# Patient Record
Sex: FEMALE | Race: WHITE | HISPANIC OR LATINO | Employment: FULL TIME | URBAN - METROPOLITAN AREA
[De-identification: names, ages, dates, MRNs, and addresses within clinical notes are randomized per-mention and may not be internally consistent; named-entity substitution may affect disease eponyms.]

---

## 2017-02-11 ENCOUNTER — HOSPITAL ENCOUNTER (OUTPATIENT)
Dept: LAB | Facility: MEDICAL CENTER | Age: 52
End: 2017-02-11
Attending: FAMILY MEDICINE
Payer: COMMERCIAL

## 2017-02-11 LAB
25(OH)D3 SERPL-MCNC: 50 NG/ML (ref 30–100)
ALBUMIN SERPL BCP-MCNC: 4 G/DL (ref 3.2–4.9)
ALBUMIN/GLOB SERPL: 1.3 G/DL
ALP SERPL-CCNC: 65 U/L (ref 30–99)
ALT SERPL-CCNC: 14 U/L (ref 2–50)
ANION GAP SERPL CALC-SCNC: 10 MMOL/L (ref 0–11.9)
AST SERPL-CCNC: 13 U/L (ref 12–45)
BASOPHILS # BLD AUTO: 0.04 K/UL (ref 0–0.12)
BASOPHILS NFR BLD AUTO: 0.6 % (ref 0–1.8)
BILIRUB SERPL-MCNC: 0.3 MG/DL (ref 0.1–1.5)
BUN SERPL-MCNC: 9 MG/DL (ref 8–22)
CALCIUM SERPL-MCNC: 9.3 MG/DL (ref 8.5–10.5)
CHLORIDE SERPL-SCNC: 105 MMOL/L (ref 96–112)
CHOLEST SERPL-MCNC: 165 MG/DL (ref 100–199)
CO2 SERPL-SCNC: 24 MMOL/L (ref 20–33)
CREAT SERPL-MCNC: 0.75 MG/DL (ref 0.5–1.4)
EOSINOPHIL # BLD: 0.11 K/UL (ref 0–0.51)
EOSINOPHIL NFR BLD AUTO: 1.7 % (ref 0–6.9)
ERYTHROCYTE [DISTWIDTH] IN BLOOD BY AUTOMATED COUNT: 44.4 FL (ref 35.9–50)
EST. AVERAGE GLUCOSE BLD GHB EST-MCNC: 134 MG/DL
ESTRADIOL SERPL-MCNC: <20 PG/ML
FSH SERPL-ACNC: 15.4 MIU/ML
GLOBULIN SER CALC-MCNC: 3.1 G/DL (ref 1.9–3.5)
GLUCOSE SERPL-MCNC: 131 MG/DL (ref 65–99)
HBA1C MFR BLD: 6.3 % (ref 0–5.6)
HCT VFR BLD AUTO: 39.6 % (ref 37–47)
HDLC SERPL-MCNC: 46 MG/DL
HGB BLD-MCNC: 12.6 G/DL (ref 12–16)
IMM GRANULOCYTES # BLD AUTO: 0.01 K/UL (ref 0–0.11)
IMM GRANULOCYTES NFR BLD AUTO: 0.2 % (ref 0–0.9)
LDLC SERPL CALC-MCNC: 97 MG/DL
LH SERPL-ACNC: 7 IU/L
LYMPHOCYTES # BLD: 1.64 K/UL (ref 1–4.8)
LYMPHOCYTES NFR BLD AUTO: 25.7 % (ref 22–41)
MCH RBC QN AUTO: 28.3 PG (ref 27–33)
MCHC RBC AUTO-ENTMCNC: 31.8 G/DL (ref 33.6–35)
MCV RBC AUTO: 88.8 FL (ref 81.4–97.8)
MONOCYTES # BLD: 0.34 K/UL (ref 0–0.85)
MONOCYTES NFR BLD AUTO: 5.3 % (ref 0–13.4)
NEUTROPHILS # BLD: 4.23 K/UL (ref 2–7.15)
NEUTROPHILS NFR BLD AUTO: 66.5 % (ref 44–72)
NRBC # BLD AUTO: 0 K/UL
NRBC BLD-RTO: 0 /100 WBC
PLATELET # BLD AUTO: 413 K/UL (ref 164–446)
PMV BLD AUTO: 9.6 FL (ref 9–12.9)
POTASSIUM SERPL-SCNC: 4.1 MMOL/L (ref 3.6–5.5)
PROT SERPL-MCNC: 7.1 G/DL (ref 6–8.2)
RBC # BLD AUTO: 4.46 M/UL (ref 4.2–5.4)
SODIUM SERPL-SCNC: 139 MMOL/L (ref 135–145)
T4 FREE SERPL-MCNC: 0.76 NG/DL (ref 0.53–1.43)
TRIGL SERPL-MCNC: 110 MG/DL (ref 0–149)
TSH SERPL DL<=0.005 MIU/L-ACNC: 0.73 UIU/ML (ref 0.3–3.7)
WBC # BLD AUTO: 6.4 K/UL (ref 4.8–10.8)

## 2017-02-11 PROCEDURE — 83001 ASSAY OF GONADOTROPIN (FSH): CPT

## 2017-02-11 PROCEDURE — 82670 ASSAY OF TOTAL ESTRADIOL: CPT

## 2017-02-11 PROCEDURE — 82306 VITAMIN D 25 HYDROXY: CPT

## 2017-02-11 PROCEDURE — 80061 LIPID PANEL: CPT

## 2017-02-11 PROCEDURE — 83002 ASSAY OF GONADOTROPIN (LH): CPT

## 2017-02-11 PROCEDURE — 85025 COMPLETE CBC W/AUTO DIFF WBC: CPT

## 2017-02-11 PROCEDURE — 80053 COMPREHEN METABOLIC PANEL: CPT

## 2017-02-11 PROCEDURE — 84439 ASSAY OF FREE THYROXINE: CPT

## 2017-02-11 PROCEDURE — 36415 COLL VENOUS BLD VENIPUNCTURE: CPT

## 2017-02-11 PROCEDURE — 84443 ASSAY THYROID STIM HORMONE: CPT

## 2017-02-11 PROCEDURE — 83036 HEMOGLOBIN GLYCOSYLATED A1C: CPT

## 2017-02-17 ENCOUNTER — HOSPITAL ENCOUNTER (OUTPATIENT)
Dept: RADIOLOGY | Facility: MEDICAL CENTER | Age: 52
End: 2017-02-17
Attending: FAMILY MEDICINE
Payer: COMMERCIAL

## 2017-02-17 DIAGNOSIS — Z13.9 SCREENING: ICD-10-CM

## 2017-02-17 PROCEDURE — 77063 BREAST TOMOSYNTHESIS BI: CPT

## 2017-05-05 ENCOUNTER — HOSPITAL ENCOUNTER (OUTPATIENT)
Dept: LAB | Facility: MEDICAL CENTER | Age: 52
End: 2017-05-05
Attending: FAMILY MEDICINE
Payer: COMMERCIAL

## 2017-05-05 LAB
ESTRADIOL SERPL-MCNC: 239 PG/ML
FSH SERPL-ACNC: 27.7 MIU/ML
LH SERPL-ACNC: 38 IU/L

## 2017-05-05 PROCEDURE — 82670 ASSAY OF TOTAL ESTRADIOL: CPT

## 2017-05-05 PROCEDURE — 36415 COLL VENOUS BLD VENIPUNCTURE: CPT

## 2017-05-05 PROCEDURE — 83001 ASSAY OF GONADOTROPIN (FSH): CPT

## 2017-05-05 PROCEDURE — 83002 ASSAY OF GONADOTROPIN (LH): CPT

## 2017-08-25 ENCOUNTER — HOSPITAL ENCOUNTER (OUTPATIENT)
Dept: LAB | Facility: MEDICAL CENTER | Age: 52
End: 2017-08-25
Attending: FAMILY MEDICINE
Payer: COMMERCIAL

## 2017-08-25 LAB
FASTING STATUS PATIENT QL REPORTED: NORMAL
TSH SERPL DL<=0.005 MIU/L-ACNC: 0.68 UIU/ML (ref 0.3–3.7)

## 2017-08-25 PROCEDURE — 36415 COLL VENOUS BLD VENIPUNCTURE: CPT

## 2017-08-25 PROCEDURE — 84443 ASSAY THYROID STIM HORMONE: CPT

## 2017-10-24 ENCOUNTER — HOSPITAL ENCOUNTER (OUTPATIENT)
Dept: LAB | Facility: MEDICAL CENTER | Age: 52
End: 2017-10-24
Attending: FAMILY MEDICINE
Payer: COMMERCIAL

## 2017-10-24 LAB
T3 SERPL-MCNC: 132.3 NG/DL (ref 60–181)
T4 FREE SERPL-MCNC: 0.67 NG/DL (ref 0.53–1.43)
TSH SERPL DL<=0.005 MIU/L-ACNC: 2.49 UIU/ML (ref 0.3–3.7)

## 2017-10-24 PROCEDURE — 36415 COLL VENOUS BLD VENIPUNCTURE: CPT

## 2017-10-24 PROCEDURE — 84480 ASSAY TRIIODOTHYRONINE (T3): CPT

## 2017-10-24 PROCEDURE — 84439 ASSAY OF FREE THYROXINE: CPT

## 2017-10-24 PROCEDURE — 84443 ASSAY THYROID STIM HORMONE: CPT

## 2017-11-10 ENCOUNTER — OFFICE VISIT (OUTPATIENT)
Dept: URGENT CARE | Facility: PHYSICIAN GROUP | Age: 52
End: 2017-11-10
Payer: COMMERCIAL

## 2017-11-10 VITALS
TEMPERATURE: 97.9 F | HEIGHT: 62 IN | BODY MASS INDEX: 30.91 KG/M2 | SYSTOLIC BLOOD PRESSURE: 122 MMHG | HEART RATE: 92 BPM | OXYGEN SATURATION: 98 % | DIASTOLIC BLOOD PRESSURE: 82 MMHG | RESPIRATION RATE: 14 BRPM | WEIGHT: 168 LBS

## 2017-11-10 DIAGNOSIS — J04.0 LARYNGITIS: ICD-10-CM

## 2017-11-10 DIAGNOSIS — J00 NASOPHARYNGITIS: ICD-10-CM

## 2017-11-10 LAB
INT CON NEG: NEGATIVE
INT CON POS: POSITIVE
S PYO AG THROAT QL: NEGATIVE

## 2017-11-10 PROCEDURE — 99214 OFFICE O/P EST MOD 30 MIN: CPT | Performed by: PHYSICIAN ASSISTANT

## 2017-11-10 PROCEDURE — 87880 STREP A ASSAY W/OPTIC: CPT | Performed by: PHYSICIAN ASSISTANT

## 2017-11-10 ASSESSMENT — ENCOUNTER SYMPTOMS
FEVER: 0
CHILLS: 0
EYE REDNESS: 0
ABDOMINAL PAIN: 0
DIZZINESS: 0
TINGLING: 0
SWOLLEN GLANDS: 0
DIARRHEA: 0
VOMITING: 0
COUGH: 1
MYALGIAS: 0
NECK PAIN: 0
TROUBLE SWALLOWING: 0
EYE DISCHARGE: 0
HOARSE VOICE: 1
WHEEZING: 0
HEADACHES: 0
SORE THROAT: 1
SPUTUM PRODUCTION: 0
SHORTNESS OF BREATH: 0

## 2017-11-10 NOTE — PROGRESS NOTES
"Subjective:      Keshia aEton is a 51 y.o. female who presents with Pharyngitis (on and off poss sinus infection )            Pharyngitis    This is a new problem. Episode onset: week ago. The problem has been waxing and waning. There has been no fever. The pain is at a severity of 3/10. The pain is mild. Associated symptoms include congestion, coughing and a hoarse voice. Pertinent negatives include no abdominal pain, diarrhea, ear discharge, ear pain, headaches, neck pain, shortness of breath, swollen glands, trouble swallowing or vomiting. Associated symptoms comments: Dry cough mostly  . She has had no exposure to strep or mono. Treatments tried: Dayquil and Nyquil formulations.       Review of Systems   Constitutional: Positive for malaise/fatigue. Negative for chills and fever.   HENT: Positive for congestion, hoarse voice and sore throat. Negative for ear discharge, ear pain and trouble swallowing.    Eyes: Negative for discharge and redness.   Respiratory: Positive for cough. Negative for sputum production, shortness of breath and wheezing.    Cardiovascular: Negative for chest pain and leg swelling.   Gastrointestinal: Negative for abdominal pain, diarrhea and vomiting.   Genitourinary: Negative for dysuria and urgency.   Musculoskeletal: Negative for myalgias and neck pain.   Skin: Negative for itching and rash.   Neurological: Negative for dizziness, tingling and headaches.          Objective:     /82   Pulse 92   Temp 36.6 °C (97.9 °F)   Resp 14   Ht 1.575 m (5' 2\")   Wt 76.2 kg (168 lb)   SpO2 98%   BMI 30.73 kg/m²    PMH:  has a past medical history of ASTHMA; Breath shortness; Depression; GERD (gastroesophageal reflux disease); Heart burn; Indigestion; Kidney stone; Obstructive sleep apnea; Renal disorder; and Unspecified disorder of thyroid. She also has no past medical history of Diabetes.  MEDS:   Current Outpatient Prescriptions:   •  buPROPion (WELLBUTRIN XL) 300 MG XL tablet, " Take 300 mg by mouth every morning., Disp: , Rfl:   •  omeprazole (PRILOSEC) 40 MG delayed-release capsule, Take 40 mg by mouth every day., Disp: , Rfl:   •  duloxetine (CYMBALTA) 60 MG Cap DR Particles delayed-release capsule, Take 60 mg by mouth every day., Disp: , Rfl:   •  beclomethasone (QVAR) 40 MCG/ACT inhaler, Inhale 1 Puff by mouth every day., Disp: , Rfl:   •  albuterol (VENTOLIN OR PROVENTIL) 108 (90 BASE) MCG/ACT AERS, Inhale 2 Puffs by mouth every 6 hours as needed for Shortness of Breath., Disp: , Rfl:   •  Cholecalciferol (VITAMIN D PO), Take 1 Cap by mouth every day., Disp: , Rfl:   •  ibuprofen (MOTRIN) 200 MG Tab, Take 600 mg by mouth every 6 hours as needed for Mild Pain., Disp: , Rfl:   •  DiphenhydrAMINE HCl (ALLERGY MEDICINE PO), Take 2 Tabs by mouth as needed (For allergies)., Disp: , Rfl:   •  metoclopramide (REGLAN) 10 MG Tab, Take 1 Tab by mouth 4 times a day as needed (prn nausea)., Disp: 20 Tab, Rfl: 0  •  dicyclomine (BENTYL) 20 MG Tab, Take 1 Tab by mouth 4 times a day as needed (abdominal pain)., Disp: 20 Tab, Rfl: 0  •  loperamide (IMODIUM) 2 MG Cap, Take 1 Cap by mouth 4 times a day as needed for Diarrhea., Disp: 30 Cap, Rfl: 3  •  trazodone (DESYREL) 100 MG Tab, Take 100 mg by mouth at bedtime as needed for Sleep., Disp: , Rfl:   •  montelukast (SINGULAIR) 10 MG TABS, Take 10 mg by mouth every day., Disp: , Rfl:   •  drospirenone-ethinyl estradiol (BROWN) 3-0.03 MG per tablet, Take 1 Tab by mouth every evening., Disp: , Rfl:   •  levothyroxine (SYNTHROID) 88 MCG TABS, Take 88 mcg by mouth every day., Disp: , Rfl:   ALLERGIES:   Allergies   Allergen Reactions   • Nkda [No Known Drug Allergy]      SURGHX:   Past Surgical History:   Procedure Laterality Date   • CYSTOSCOPY  7/12/2012    Performed by REUBEN SHANNON at SURGERY [facility] ORS   • URETEROSCOPY  7/12/2012    Performed by REUBEN SHANNON at SURGERY [facility] ORS   • LASERTRIPSY  7/12/2012    Performed by MIRTHA,  REUBEN HOOKER at SURGERY [facility] ORS   • MAMMOPLASTY AUGMENTATION  dec 2008   • OTHER ABDOMINAL SURGERY  dec 2008    scar revision - tummy tuck   • PB ENLARGE BREAST WITH IMPLANT       SOCHX:  reports that she quit smoking about 7 years ago. She has never used smokeless tobacco. She reports that she drinks alcohol. She reports that she does not use drugs.  FH: Family history was reviewed, no pertinent findings to report    Physical Exam   Constitutional: She is oriented to person, place, and time. She appears well-developed and well-nourished.   HENT:   Head: Normocephalic and atraumatic.   Mouth/Throat: No oropharyngeal exudate.   Bilateral clear effusions- without bulge or erythema to the TM.   Posterior oropharynx with pos. PND without tonsillar edema or erythema.   Nose- boggy turbinates with mild-moderate amount of nasal discharge.   Eyes: EOM are normal. Pupils are equal, round, and reactive to light.   Neck: Normal range of motion. Neck supple.   Cardiovascular: Normal rate and regular rhythm.    Pulmonary/Chest: Effort normal and breath sounds normal. No respiratory distress. She has no wheezes.   Musculoskeletal: Normal range of motion. She exhibits no edema.   Lymphadenopathy:     She has no cervical adenopathy.   Neurological: She is alert and oriented to person, place, and time.   Skin: Skin is warm. No rash noted.   Psychiatric: She has a normal mood and affect. Her behavior is normal.   Vitals reviewed.           POC strep- negative.      Assessment/Plan:     1. Laryngitis  - POCT Rapid Strep A    2. Nasopharyngitis    It was explained to the pt. Today that due to the viral nature of the pt's illness, we will treat symptomatically today. Encouraged OTC supportive meds PRN. Humidification, increase fluids, avoid night time dairy, tea and salt water gargles.   Patient given precautionary s/sx that mandate immediate follow up and evaluation in the ED. Advised of risks of not doing so.    DDX, Supportive  care, and indications for immediate follow-up discussed with patient.    Instructed to return to clinic or nearest emergency department if we are not available for any change in condition, further concerns, or worsening of symptoms.    The patient demonstrated a good understanding and agreed with the treatment plan.

## 2017-11-14 ENCOUNTER — TELEPHONE (OUTPATIENT)
Dept: CARDIOLOGY | Facility: MEDICAL CENTER | Age: 52
End: 2017-11-14

## 2017-11-14 NOTE — TELEPHONE ENCOUNTER
Spoke with patient; gave her instructions to our office; never seen a cardiologist; no recent cardiac testing other than EKG in chart under media; advised to bring ID and INS cards and a medication list.  Pt thankful for the phone call.

## 2017-11-17 ENCOUNTER — OFFICE VISIT (OUTPATIENT)
Dept: CARDIOLOGY | Facility: MEDICAL CENTER | Age: 52
End: 2017-11-17
Payer: COMMERCIAL

## 2017-11-17 VITALS
HEIGHT: 62 IN | HEART RATE: 102 BPM | DIASTOLIC BLOOD PRESSURE: 86 MMHG | OXYGEN SATURATION: 96 % | BODY MASS INDEX: 30.18 KG/M2 | SYSTOLIC BLOOD PRESSURE: 144 MMHG | WEIGHT: 164 LBS

## 2017-11-17 DIAGNOSIS — F15.90 OTHER STIMULANT USE, UNSPECIFIED, UNCOMPLICATED: ICD-10-CM

## 2017-11-17 DIAGNOSIS — G47.33 OBSTRUCTIVE SLEEP APNEA: ICD-10-CM

## 2017-11-17 DIAGNOSIS — F41.9 ANXIETY: ICD-10-CM

## 2017-11-17 DIAGNOSIS — J45.20 MILD INTERMITTENT ASTHMA WITHOUT COMPLICATION: ICD-10-CM

## 2017-11-17 DIAGNOSIS — R00.0 TACHYCARDIA: ICD-10-CM

## 2017-11-17 PROCEDURE — 99243 OFF/OP CNSLTJ NEW/EST LOW 30: CPT | Performed by: INTERNAL MEDICINE

## 2017-11-17 RX ORDER — BUPROPION HYDROCHLORIDE 150 MG/1
150 TABLET, EXTENDED RELEASE ORAL 2 TIMES DAILY
COMMUNITY
End: 2019-10-26

## 2017-11-17 RX ORDER — THYROID, PORCINE 60 MG/1
65 TABLET ORAL DAILY
COMMUNITY
End: 2019-10-26

## 2017-11-17 RX ORDER — DULOXETIN HYDROCHLORIDE 30 MG/1
30 CAPSULE, DELAYED RELEASE ORAL DAILY
COMMUNITY

## 2017-11-17 NOTE — PROGRESS NOTES
Subjective:   Keshia Eaton is a 51 y.o. female who presents today For initial consultation regarding intermittent sinus tachycardia. She has a history of untreated mild obstructive sleep apnea, depression on high-dose Wellbutrin and Cymbalta as well as obesity currently undergoing weight loss therapy with the addition of phentermine. She also has a history of anxiety. She presents in referral after several primary care visits have revealed sinus tachycardia. She had an EKG at her last appointment which is sinus tachycardia at 102 bpm. There is quite a bit of artifact during the tracing in the computer readout is an error. It is an otherwise normal ECG. She has no symptoms or exertional symptoms and spends 3 times per week and her efforts towards weight loss. Also she has noted to undergo menopause with hot flashes and mood swings.    Denies any other cardiovascular symptoms including chest pain, shortness of breath, dyspnea on exertion, lightheadedness, syncope or presyncope, lower extremity edema, PND, orthopnea or palpitations.      Past Medical History:   Diagnosis Date   • ASTHMA    • Breath shortness    • Depression    • GERD (gastroesophageal reflux disease)    • Heart burn    • Indigestion    • Kidney stone    • Obstructive sleep apnea    • Renal disorder     KIDNEY STONE   • Unspecified disorder of thyroid      Past Surgical History:   Procedure Laterality Date   • CYSTOSCOPY  7/12/2012    Performed by REUBEN SHANNON at SURGERY [facility] ORS   • URETEROSCOPY  7/12/2012    Performed by REUBEN SHANNON at SURGERY [facility]   • LASERTRIPSY  7/12/2012    Performed by REUBEN SHANNON at [facility]   • MAMMOPLASTY AUGMENTATION  dec 2008   • OTHER ABDOMINAL SURGERY  dec 2008    scar revision - tummy tuck   • PB ENLARGE BREAST WITH IMPLANT       Family History   Problem Relation Age of Onset   • Diabetes Mother    • Stroke Mother    • Hypertension Mother    • Stroke Father       History   Smoking Status   • Former Smoker   • Quit date: 1/1/2010   Smokeless Tobacco   • Never Used     Comment: 1 month ago     Allergies   Allergen Reactions   • Nkda [No Known Drug Allergy]      Outpatient Encounter Prescriptions as of 11/17/2017   Medication Sig Dispense Refill   • buPROPion SR (WELLBUTRIN-SR) 150 MG TABLET SR 12 HR sustained-release tablet Take 150 mg by mouth 2 times a day.     • duloxetine (CYMBALTA) 30 MG Cap DR Particles Take 30 mg by mouth every day.     • Mometasone Furoate (ASMANEX HFA INH) Inhale  by mouth.     • thyroid (NATURE-THROID) 65 MG tablet Take 65 mg by mouth every day.     • buPROPion (WELLBUTRIN XL) 300 MG XL tablet Take 300 mg by mouth every morning.     • omeprazole (PRILOSEC) 40 MG delayed-release capsule Take 40 mg by mouth every day.     • Cholecalciferol (VITAMIN D PO) Take 1 Cap by mouth every day.     • ibuprofen (MOTRIN) 200 MG Tab Take 600 mg by mouth every 6 hours as needed for Mild Pain.     • DiphenhydrAMINE HCl (ALLERGY MEDICINE PO) Take 2 Tabs by mouth as needed (For allergies).     • albuterol (VENTOLIN OR PROVENTIL) 108 (90 BASE) MCG/ACT AERS Inhale 2 Puffs by mouth every 6 hours as needed for Shortness of Breath.     • [DISCONTINUED] duloxetine (CYMBALTA) 60 MG Cap DR Particles delayed-release capsule Take 60 mg by mouth every day.     • [DISCONTINUED] metoclopramide (REGLAN) 10 MG Tab Take 1 Tab by mouth 4 times a day as needed (prn nausea). 20 Tab 0   • [DISCONTINUED] dicyclomine (BENTYL) 20 MG Tab Take 1 Tab by mouth 4 times a day as needed (abdominal pain). 20 Tab 0   • [DISCONTINUED] loperamide (IMODIUM) 2 MG Cap Take 1 Cap by mouth 4 times a day as needed for Diarrhea. 30 Cap 3   • [DISCONTINUED] trazodone (DESYREL) 100 MG Tab Take 100 mg by mouth at bedtime as needed for Sleep.     • [DISCONTINUED] montelukast (SINGULAIR) 10 MG TABS Take 10 mg by mouth every day.     • [DISCONTINUED] beclomethasone (QVAR) 40 MCG/ACT inhaler Inhale 1 Puff by  "mouth every day.     • [DISCONTINUED] drospirenone-ethinyl estradiol (BROWN) 3-0.03 MG per tablet Take 1 Tab by mouth every evening.     • [DISCONTINUED] levothyroxine (SYNTHROID) 88 MCG TABS Take 88 mcg by mouth every day.       No facility-administered encounter medications on file as of 11/17/2017.      Review of Systems   All other systems reviewed and are negative.       Objective:   /86   Pulse (!) 102   Ht 1.575 m (5' 2\")   Wt 74.4 kg (164 lb)   SpO2 96%   BMI 30.00 kg/m²     Physical Exam   Constitutional: She is oriented to person, place, and time. She appears well-developed and well-nourished. No distress.   HENT:   Head: Normocephalic and atraumatic.   Mouth/Throat: Oropharynx is clear and moist. No oropharyngeal exudate.   Eyes: Conjunctivae are normal. Pupils are equal, round, and reactive to light. No scleral icterus.   Neck: Normal range of motion. Neck supple. No JVD present. No thyromegaly present.   Cardiovascular: Normal rate, regular rhythm, normal heart sounds and intact distal pulses.  Exam reveals no gallop and no friction rub.    No murmur heard.  Pulses:       Carotid pulses are 2+ on the right side, and 2+ on the left side.       Radial pulses are 2+ on the right side, and 2+ on the left side.        Popliteal pulses are 2+ on the right side, and 2+ on the left side.        Dorsalis pedis pulses are 2+ on the right side, and 2+ on the left side.        Posterior tibial pulses are 2+ on the right side, and 2+ on the left side.   Pulmonary/Chest: Effort normal and breath sounds normal. She has no wheezes. She has no rales.   Abdominal: Soft. Bowel sounds are normal. She exhibits no distension. There is no tenderness.   Musculoskeletal: She exhibits no edema or tenderness.   Neurological: She is alert and oriented to person, place, and time. No cranial nerve deficit (Cranial nerves II through XII grossly intact).   Skin: Skin is warm and dry. No rash noted. She is not diaphoretic. " No erythema.   Psychiatric: She has a normal mood and affect. Her behavior is normal.   Vitals reviewed.    EKG (10/26/2017):  I have personally reviewed the EKG this visit and discussed with the patient. It shows sinus tachycardia 102 bpm. OTHERWISE NORMAL ECG    Assessment:     1. Tachycardia  ECHOCARDIOGRAM COMP W/O CONT   2. Obstructive sleep apnea     3. Mild intermittent asthma without complication     4. Other stimulant use, unspecified, uncomplicated     5. Anxiety         Medical Decision Making:  Today's Assessment / Status / Plan:   She has intermittent asymptomatic sinus tachycardia explained by her phentermine, anxiety, deconditioning, perimenopausal state as well as untreated sleep apnea. Of note, she has multiple visits in the [place] system with blood pressure and heart rates that are all normal as recently as last month. We discussed that her resting tachycardia is likely a byproduct of the above but we will check an echocardiogram to ensure there are no structural changes that would contribute. Otherwise she should make efforts towards weight loss reconditioning and trying to treat her sleep apnea. If the echocardiogram is unremarkable and recommend follow-up as needed with cardiology only.    Thank you for this interesting consultation. It was my pleasure to see Keshia Eaton today.    Cayden Spicer MD, FACC, [facility]  Division of Interventional Cardiology  [facility]

## 2017-11-17 NOTE — LETTER
[facility]   [address]  [place] [address]  Phone: [phone]  Fax: [phone]              Keshia Eaton  1965    Encounter Date: 11/17/2017    Dr. Florentino,     Thank you for the referral. I had the pleasure of seeing Keshia Eaton today in cardiology clinic. I've attached my visit note below. If you have any questions please feel free to give me a call anytime.      Cayden Spicer MD, FACC, [facility]  Interventional Cardiology  [facility]                                                                    PROGRESS NOTE:  Subjective:   Keshia Eaton is a 51 y.o. female who presents today For initial consultation regarding intermittent sinus tachycardia. She has a history of untreated mild obstructive sleep apnea, depression on high-dose Wellbutrin and Cymbalta as well as obesity currently undergoing weight loss therapy with the addition of phentermine. She also has a history of anxiety. She presents in referral after several primary care visits have revealed sinus tachycardia. She had an EKG at her last appointment which is sinus tachycardia at 102 bpm. There is quite a bit of artifact during the tracing in the computer readout is an error. It is an otherwise normal ECG. She has no symptoms or exertional symptoms and spends 3 times per week and her efforts towards weight loss. Also she has noted to undergo menopause with hot flashes and mood swings.    Denies any other cardiovascular symptoms including chest pain, shortness of breath, dyspnea on exertion, lightheadedness, syncope or presyncope, lower extremity edema, PND, orthopnea or palpitations.      Past Medical History:   Diagnosis Date   • ASTHMA    • Breath shortness    • Depression    • GERD (gastroesophageal reflux disease)    • Heart burn    • Indigestion    • Kidney stone    • Obstructive sleep apnea    • Renal disorder     KIDNEY  STONE   • Unspecified disorder of thyroid      Past Surgical History:   Procedure Laterality Date   • CYSTOSCOPY  7/12/2012    Performed by REUBEN SHANNON at SURGERY [facility] ORS   • URETEROSCOPY  7/12/2012    Performed by REUBEN SHANNON at SURGERY [facility] ORS   • LASERTRIPSY  7/12/2012    Performed by REUBEN SHANNON at SURGERY [facility] ORS   • MAMMOPLASTY AUGMENTATION  dec 2008   • OTHER ABDOMINAL SURGERY  dec 2008    scar revision - tummy tuck   • PB ENLARGE BREAST WITH IMPLANT       Family History   Problem Relation Age of Onset   • Diabetes Mother    • Stroke Mother    • Hypertension Mother    • Stroke Father      History   Smoking Status   • Former Smoker   • Quit date: 1/1/2010   Smokeless Tobacco   • Never Used     Comment: 1 month ago     Allergies   Allergen Reactions   • Nkda [No Known Drug Allergy]      Outpatient Encounter Prescriptions as of 11/17/2017   Medication Sig Dispense Refill   • buPROPion SR (WELLBUTRIN-SR) 150 MG TABLET SR 12 HR sustained-release tablet Take 150 mg by mouth 2 times a day.     • duloxetine (CYMBALTA) 30 MG Cap DR Particles Take 30 mg by mouth every day.     • Mometasone Furoate (ASMANEX HFA INH) Inhale  by mouth.     • thyroid (NATURE-THROID) 65 MG tablet Take 65 mg by mouth every day.     • buPROPion (WELLBUTRIN XL) 300 MG XL tablet Take 300 mg by mouth every morning.     • omeprazole (PRILOSEC) 40 MG delayed-release capsule Take 40 mg by mouth every day.     • Cholecalciferol (VITAMIN D PO) Take 1 Cap by mouth every day.     • ibuprofen (MOTRIN) 200 MG Tab Take 600 mg by mouth every 6 hours as needed for Mild Pain.     • DiphenhydrAMINE HCl (ALLERGY MEDICINE PO) Take 2 Tabs by mouth as needed (For allergies).     • albuterol (VENTOLIN OR PROVENTIL) 108 (90 BASE) MCG/ACT AERS Inhale 2 Puffs by mouth every 6 hours as needed for Shortness of Breath.     • [DISCONTINUED] duloxetine (CYMBALTA) 60 MG Cap DR Particles delayed-release capsule Take 60 mg by  "mouth every day.     • [DISCONTINUED] metoclopramide (REGLAN) 10 MG Tab Take 1 Tab by mouth 4 times a day as needed (prn nausea). 20 Tab 0   • [DISCONTINUED] dicyclomine (BENTYL) 20 MG Tab Take 1 Tab by mouth 4 times a day as needed (abdominal pain). 20 Tab 0   • [DISCONTINUED] loperamide (IMODIUM) 2 MG Cap Take 1 Cap by mouth 4 times a day as needed for Diarrhea. 30 Cap 3   • [DISCONTINUED] trazodone (DESYREL) 100 MG Tab Take 100 mg by mouth at bedtime as needed for Sleep.     • [DISCONTINUED] montelukast (SINGULAIR) 10 MG TABS Take 10 mg by mouth every day.     • [DISCONTINUED] beclomethasone (QVAR) 40 MCG/ACT inhaler Inhale 1 Puff by mouth every day.     • [DISCONTINUED] drospirenone-ethinyl estradiol (BROWN) 3-0.03 MG per tablet Take 1 Tab by mouth every evening.     • [DISCONTINUED] levothyroxine (SYNTHROID) 88 MCG TABS Take 88 mcg by mouth every day.       No facility-administered encounter medications on file as of 11/17/2017.      Review of Systems   All other systems reviewed and are negative.       Objective:   /86   Pulse (!) 102   Ht 1.575 m (5' 2\")   Wt 74.4 kg (164 lb)   SpO2 96%   BMI 30.00 kg/m²      Physical Exam   Constitutional: She is oriented to person, place, and time. She appears well-developed and well-nourished. No distress.   HENT:   Head: Normocephalic and atraumatic.   Mouth/Throat: Oropharynx is clear and moist. No oropharyngeal exudate.   Eyes: Conjunctivae are normal. Pupils are equal, round, and reactive to light. No scleral icterus.   Neck: Normal range of motion. Neck supple. No JVD present. No thyromegaly present.   Cardiovascular: Normal rate, regular rhythm, normal heart sounds and intact distal pulses.  Exam reveals no gallop and no friction rub.    No murmur heard.  Pulses:       Carotid pulses are 2+ on the right side, and 2+ on the left side.       Radial pulses are 2+ on the right side, and 2+ on the left side.        Popliteal pulses are 2+ on the right side, " and 2+ on the left side.        Dorsalis pedis pulses are 2+ on the right side, and 2+ on the left side.        Posterior tibial pulses are 2+ on the right side, and 2+ on the left side.   Pulmonary/Chest: Effort normal and breath sounds normal. She has no wheezes. She has no rales.   Abdominal: Soft. Bowel sounds are normal. She exhibits no distension. There is no tenderness.   Musculoskeletal: She exhibits no edema or tenderness.   Neurological: She is alert and oriented to person, place, and time. No cranial nerve deficit (Cranial nerves II through XII grossly intact).   Skin: Skin is warm and dry. No rash noted. She is not diaphoretic. No erythema.   Psychiatric: She has a normal mood and affect. Her behavior is normal.   Vitals reviewed.    EKG (10/26/2017):  I have personally reviewed the EKG this visit and discussed with the patient. It shows sinus tachycardia 102 bpm. OTHERWISE NORMAL ECG    Assessment:     1. Tachycardia  ECHOCARDIOGRAM COMP W/O CONT   2. Obstructive sleep apnea     3. Mild intermittent asthma without complication     4. Other stimulant use, unspecified, uncomplicated     5. Anxiety         Medical Decision Making:  Today's Assessment / Status / Plan:   She has intermittent asymptomatic sinus tachycardia explained by her phentermine, anxiety, deconditioning, perimenopausal state as well as untreated sleep apnea. Of note, she has multiple visits in the [place] system with blood pressure and heart rates that are all normal as recently as last month. We discussed that her resting tachycardia is likely a byproduct of the above but we will check an echocardiogram to ensure there are no structural changes that would contribute. Otherwise she should make efforts towards weight loss reconditioning and trying to treat her sleep apnea. If the echocardiogram is unremarkable and recommend follow-up as needed with cardiology only.    Thank you for this interesting consultation. It was my pleasure to see  Keshia Eaton today.    Cayden Spicer MD, FACC, [facility]  Division of Interventional Cardiology  [facility]          Rona Florentino M.D.  [address]  [address] [place] [id number]  VIA Facsimile: [phone]

## 2017-12-08 ENCOUNTER — HOSPITAL ENCOUNTER (OUTPATIENT)
Dept: CARDIOLOGY | Facility: MEDICAL CENTER | Age: 52
End: 2017-12-08
Attending: INTERNAL MEDICINE
Payer: COMMERCIAL

## 2017-12-08 DIAGNOSIS — R00.0 TACHYCARDIA: ICD-10-CM

## 2017-12-08 PROCEDURE — 93306 TTE W/DOPPLER COMPLETE: CPT | Mod: 26 | Performed by: INTERNAL MEDICINE

## 2017-12-08 PROCEDURE — 93306 TTE W/DOPPLER COMPLETE: CPT

## 2017-12-12 LAB
LV EJECT FRACT  99904: 75
LV EJECT FRACT MOD 2C 99903: 85.89
LV EJECT FRACT MOD 4C 99902: 80.42
LV EJECT FRACT MOD BP 99901: 82.98

## 2018-01-05 ENCOUNTER — TELEPHONE (OUTPATIENT)
Dept: CARDIOLOGY | Facility: MEDICAL CENTER | Age: 53
End: 2018-01-05

## 2018-01-05 NOTE — TELEPHONE ENCOUNTER
Pt notified of Echo results. She denies questions at this time and is appreciative of information.    --------------------------  Echocardiography Laboratory    CONCLUSIONS  No prior study is available for comparison.   Low normal left ventricular chamber size.  Normal left ventricular wall thickness.  Normal left ventricular systolic function.  Left ventricular ejection fraction is visually estimated to be greater   than 75%.  Structurally normal aortic and mitral valve without significant   stenosis or regurgitation.  Structurally normal tricuspid valve without significant stenosis or   regurgitation.  Normal pericardium without effusion.  Normal inferior vena cava size and inspiratory collapse.  The right ventricle was normal in size and function.  Dalila Morales M.D.  (Electronically Signed)  ------------------------------------    ----- Message from Barbara Mckenzie sent at 1/5/2018  8:09 AM PST -----  Regarding: echo results  Contact: [phone]  MARVIN/jatin    Pt calling for results of echo done on 12/8, please call Keshia at [phone]

## 2018-03-09 ENCOUNTER — HOSPITAL ENCOUNTER (OUTPATIENT)
Dept: LAB | Facility: MEDICAL CENTER | Age: 53
End: 2018-03-09
Attending: FAMILY MEDICINE
Payer: COMMERCIAL

## 2018-03-09 LAB — TSH SERPL DL<=0.005 MIU/L-ACNC: 1.17 UIU/ML (ref 0.38–5.33)

## 2018-03-09 PROCEDURE — 36415 COLL VENOUS BLD VENIPUNCTURE: CPT

## 2018-03-09 PROCEDURE — 84443 ASSAY THYROID STIM HORMONE: CPT

## 2018-06-01 ENCOUNTER — APPOINTMENT (RX ONLY)
Dept: URBAN - METROPOLITAN AREA CLINIC 20 | Facility: CLINIC | Age: 53
Setting detail: DERMATOLOGY
End: 2018-06-01

## 2018-06-01 DIAGNOSIS — L70.0 ACNE VULGARIS: ICD-10-CM

## 2018-06-01 DIAGNOSIS — L85.3 XEROSIS CUTIS: ICD-10-CM

## 2018-06-01 PROBLEM — K21.9 GASTRO-ESOPHAGEAL REFLUX DISEASE WITHOUT ESOPHAGITIS: Status: ACTIVE | Noted: 2018-06-01

## 2018-06-01 PROBLEM — J45.909 UNSPECIFIED ASTHMA, UNCOMPLICATED: Status: ACTIVE | Noted: 2018-06-01

## 2018-06-01 PROBLEM — F32.9 MAJOR DEPRESSIVE DISORDER, SINGLE EPISODE, UNSPECIFIED: Status: ACTIVE | Noted: 2018-06-01

## 2018-06-01 PROCEDURE — ? COUNSELING

## 2018-06-01 PROCEDURE — 99202 OFFICE O/P NEW SF 15 MIN: CPT

## 2018-06-01 PROCEDURE — ? PRESCRIPTION

## 2018-06-01 RX ORDER — DESONIDE 0.5 MG/G
GEL TOPICAL
Qty: 1 | Refills: 1 | Status: ERX | COMMUNITY
Start: 2018-06-01

## 2018-06-01 RX ORDER — SPIRONOLACTONE 100 MG/1
TABLET, FILM COATED ORAL
Qty: 30 | Refills: 2 | Status: ERX | COMMUNITY
Start: 2018-06-01

## 2018-06-01 RX ADMIN — DESONIDE: 0.5 GEL TOPICAL at 00:00

## 2018-06-01 RX ADMIN — SPIRONOLACTONE: 100 TABLET, FILM COATED ORAL at 00:00

## 2018-06-01 ASSESSMENT — LOCATION SIMPLE DESCRIPTION DERM
LOCATION SIMPLE: RIGHT SMALL FINGER
LOCATION SIMPLE: LEFT RING FINGER
LOCATION SIMPLE: RIGHT THUMB
LOCATION SIMPLE: RIGHT INDEX FINGER
LOCATION SIMPLE: LEFT CHEEK
LOCATION SIMPLE: LEFT THUMB
LOCATION SIMPLE: LEFT INDEX FINGER
LOCATION SIMPLE: LEFT SMALL FINGER
LOCATION SIMPLE: RIGHT RING FINGER
LOCATION SIMPLE: CHEST
LOCATION SIMPLE: LEFT MIDDLE FINGER
LOCATION SIMPLE: LEFT LIP
LOCATION SIMPLE: RIGHT MIDDLE FINGER

## 2018-06-01 ASSESSMENT — LOCATION DETAILED DESCRIPTION DERM
LOCATION DETAILED: LEFT INFERIOR CENTRAL MALAR CHEEK
LOCATION DETAILED: RIGHT INDEX FINGERTIP
LOCATION DETAILED: LEFT SMALL FINGERTIP
LOCATION DETAILED: RIGHT MIDDLE FINGERTIP
LOCATION DETAILED: LEFT MIDDLE FINGERTIP
LOCATION DETAILED: LEFT INFERIOR VERMILION LIP
LOCATION DETAILED: RIGHT SMALL FINGERTIP
LOCATION DETAILED: RIGHT DISTAL RADIAL THUMB
LOCATION DETAILED: LEFT RING FINGERTIP
LOCATION DETAILED: RIGHT RING FINGERTIP
LOCATION DETAILED: LEFT INDEX FINGERTIP
LOCATION DETAILED: LOWER STERNUM
LOCATION DETAILED: LEFT DISTAL RADIAL THUMB

## 2018-06-01 ASSESSMENT — LOCATION ZONE DERM
LOCATION ZONE: TRUNK
LOCATION ZONE: LIP
LOCATION ZONE: FACE
LOCATION ZONE: FINGER

## 2018-06-01 NOTE — PROCEDURE: COUNSELING
Include Pregnancy/Lactation Warning?: No
Detail Level: Zone
Benzoyl Peroxide Pregnancy And Lactation Text: This medication is Pregnancy Category C. It is unknown if benzoyl peroxide is excreted in breast milk.
Dapsone Pregnancy And Lactation Text: This medication is Pregnancy Category C and is not considered safe during pregnancy or breast feeding.
Spironolactone Pregnancy And Lactation Text: This medication can cause feminization of the male fetus and should be avoided during pregnancy. The active metabolite is also found in breast milk.
Azithromycin Counseling:  I discussed with the patient the risks of azithromycin including but not limited to GI upset, allergic reaction, drug rash, diarrhea, and yeast infections.
Minocycline Counseling: Patient advised regarding possible photosensitivity and discoloration of the teeth, skin, lips, tongue and gums.  Patient instructed to avoid sunlight, if possible.  When exposed to sunlight, patients should wear protective clothing, sunglasses, and sunscreen.  The patient was instructed to call the office immediately if the following severe adverse effects occur:  hearing changes, easy bruising/bleeding, severe headache, or vision changes.  The patient verbalized understanding of the proper use and possible adverse effects of minocycline.  All of the patient's questions and concerns were addressed.
Tetracycline Pregnancy And Lactation Text: This medication is Pregnancy Category D and not consider safe during pregnancy. It is also excreted in breast milk.
Topical Retinoid Pregnancy And Lactation Text: This medication is Pregnancy Category C. It is unknown if this medication is excreted in breast milk.
Birth Control Pills Counseling: Birth Control Pill Counseling: I discussed with the patient the potential side effects of OCPs including but not limited to increased risk of stroke, heart attack, thrombophlebitis, deep venous thrombosis, hepatic adenomas, breast changes, GI upset, headaches, and depression.  The patient verbalized understanding of the proper use and possible adverse effects of OCPs. All of the patient's questions and concerns were addressed.
Dapsone Counseling: I discussed with the patient the risks of dapsone including but not limited to hemolytic anemia, agranulocytosis, rashes, methemoglobinemia, kidney failure, peripheral neuropathy, headaches, GI upset, and liver toxicity.  Patients who start dapsone require monitoring including baseline LFTs and weekly CBCs for the first month, then every month thereafter.  The patient verbalized understanding of the proper use and possible adverse effects of dapsone.  All of the patient's questions and concerns were addressed.
Spironolactone Counseling: Patient advised regarding risks of diarrhea, abdominal pain, hyperkalemia, birth defects (for female patients), liver toxicity and renal toxicity. The patient may need blood work to monitor liver and kidney function and potassium levels while on therapy. The patient verbalized understanding of the proper use and possible adverse effects of spironolactone.  All of the patient's questions and concerns were addressed.
Birth Control Pills Pregnancy And Lactation Text: This medication should be avoided if pregnant and for the first 30 days post-partum.
High Dose Vitamin A Counseling: Side effects reviewed, pt to contact office should one occur.
High Dose Vitamin A Pregnancy And Lactation Text: High dose vitamin A therapy is contraindicated during pregnancy and breast feeding.
Tazorac Counseling:  Patient advised that medication is irritating and drying.  Patient may need to apply sparingly and wash off after an hour before eventually leaving it on overnight.  The patient verbalized understanding of the proper use and possible adverse effects of tazorac.  All of the patient's questions and concerns were addressed.
Benzoyl Peroxide Counseling: Patient counseled that medicine may cause skin irritation and bleach clothing.  In the event of skin irritation, the patient was advised to reduce the amount of the drug applied or use it less frequently.   The patient verbalized understanding of the proper use and possible adverse effects of benzoyl peroxide.  All of the patient's questions and concerns were addressed.
Tazorac Pregnancy And Lactation Text: This medication is not safe during pregnancy. It is unknown if this medication is excreted in breast milk.
Topical Clindamycin Counseling: Patient counseled that this medication may cause skin irritation or allergic reactions.  In the event of skin irritation, the patient was advised to reduce the amount of the drug applied or use it less frequently.   The patient verbalized understanding of the proper use and possible adverse effects of clindamycin.  All of the patient's questions and concerns were addressed.
Azithromycin Pregnancy And Lactation Text: This medication is considered safe during pregnancy and is also secreted in breast milk.
Isotretinoin Pregnancy And Lactation Text: This medication is Pregnancy Category X and is considered extremely dangerous during pregnancy. It is unknown if it is excreted in breast milk.
Bactrim Counseling:  I discussed with the patient the risks of sulfa antibiotics including but not limited to GI upset, allergic reaction, drug rash, diarrhea, dizziness, photosensitivity, and yeast infections.  Rarely, more serious reactions can occur including but not limited to aplastic anemia, agranulocytosis, methemoglobinemia, blood dyscrasias, liver or kidney failure, lung infiltrates or desquamative/blistering drug rashes.
Topical Retinoid counseling:  Patient advised to apply a pea-sized amount only at bedtime and wait 30 minutes after washing their face before applying.  If too drying, patient may add a non-comedogenic moisturizer. The patient verbalized understanding of the proper use and possible adverse effects of retinoids.  All of the patient's questions and concerns were addressed.
Bactrim Pregnancy And Lactation Text: This medication is Pregnancy Category D and is known to cause fetal risk.  It is also excreted in breast milk.
Isotretinoin Counseling: Patient should get monthly blood tests, not donate blood, not drive at night if vision affected, not share medication, and not undergo elective surgery for 6 months after tx completed. Side effects reviewed, pt to contact office should one occur.
Tetracycline Counseling: Patient counseled regarding possible photosensitivity and increased risk for sunburn.  Patient instructed to avoid sunlight, if possible.  When exposed to sunlight, patients should wear protective clothing, sunglasses, and sunscreen.  The patient was instructed to call the office immediately if the following severe adverse effects occur:  hearing changes, easy bruising/bleeding, severe headache, or vision changes.  The patient verbalized understanding of the proper use and possible adverse effects of tetracycline.  All of the patient's questions and concerns were addressed. Patient understands to avoid pregnancy while on therapy due to potential birth defects.
Erythromycin Pregnancy And Lactation Text: This medication is Pregnancy Category B and is considered safe during pregnancy. It is also excreted in breast milk.
Topical Clindamycin Pregnancy And Lactation Text: This medication is Pregnancy Category B and is considered safe during pregnancy. It is unknown if it is excreted in breast milk.
Topical Sulfur Applications Pregnancy And Lactation Text: This medication is Pregnancy Category C and has an unknown safety profile during pregnancy. It is unknown if this topical medication is excreted in breast milk.
Topical Sulfur Applications Counseling: Topical Sulfur Counseling: Patient counseled that this medication may cause skin irritation or allergic reactions.  In the event of skin irritation, the patient was advised to reduce the amount of the drug applied or use it less frequently.   The patient verbalized understanding of the proper use and possible adverse effects of topical sulfur application.  All of the patient's questions and concerns were addressed.
Doxycycline Counseling:  Patient counseled regarding possible photosensitivity and increased risk for sunburn.  Patient instructed to avoid sunlight, if possible.  When exposed to sunlight, patients should wear protective clothing, sunglasses, and sunscreen.  The patient was instructed to call the office immediately if the following severe adverse effects occur:  hearing changes, easy bruising/bleeding, severe headache, or vision changes.  The patient verbalized understanding of the proper use and possible adverse effects of doxycycline.  All of the patient's questions and concerns were addressed.
Doxycycline Pregnancy And Lactation Text: This medication is Pregnancy Category D and not consider safe during pregnancy. It is also excreted in breast milk but is considered safe for shorter treatment courses.
Erythromycin Counseling:  I discussed with the patient the risks of erythromycin including but not limited to GI upset, allergic reaction, drug rash, diarrhea, increase in liver enzymes, and yeast infections.
Patient Specific Counseling (Will Not Stick From Patient To Patient): Maintain with CeraVe Healing Ointment.

## 2018-06-05 ENCOUNTER — APPOINTMENT (RX ONLY)
Dept: URBAN - METROPOLITAN AREA CLINIC 20 | Facility: CLINIC | Age: 53
Setting detail: DERMATOLOGY
End: 2018-06-05

## 2018-06-05 DIAGNOSIS — L85.3 XEROSIS CUTIS: ICD-10-CM

## 2018-06-05 PROCEDURE — ? PRESCRIPTION

## 2018-06-05 RX ORDER — DESONIDE 0.5 MG/G
OINTMENT TOPICAL
Qty: 1 | Refills: 1 | Status: ERX

## 2018-06-25 ENCOUNTER — OFFICE VISIT (OUTPATIENT)
Dept: URGENT CARE | Facility: PHYSICIAN GROUP | Age: 53
End: 2018-06-25
Payer: COMMERCIAL

## 2018-06-25 VITALS
TEMPERATURE: 98.3 F | HEIGHT: 62 IN | DIASTOLIC BLOOD PRESSURE: 98 MMHG | WEIGHT: 174 LBS | HEART RATE: 91 BPM | SYSTOLIC BLOOD PRESSURE: 156 MMHG | OXYGEN SATURATION: 96 % | BODY MASS INDEX: 32.02 KG/M2

## 2018-06-25 DIAGNOSIS — R03.0 ELEVATED BLOOD PRESSURE READING: ICD-10-CM

## 2018-06-25 DIAGNOSIS — L03.012 PARONYCHIA OF FINGER OF LEFT HAND: ICD-10-CM

## 2018-06-25 PROCEDURE — 99214 OFFICE O/P EST MOD 30 MIN: CPT | Performed by: PHYSICIAN ASSISTANT

## 2018-06-25 RX ORDER — AMOXICILLIN AND CLAVULANATE POTASSIUM 875; 125 MG/1; MG/1
1 TABLET, FILM COATED ORAL 2 TIMES DAILY
Qty: 14 TAB | Refills: 0 | Status: SHIPPED | OUTPATIENT
Start: 2018-06-25 | End: 2018-07-02

## 2018-06-25 RX ORDER — SPIRONOLACTONE 100 MG/1
100 TABLET, FILM COATED ORAL DAILY
COMMUNITY
End: 2019-10-26

## 2018-06-25 ASSESSMENT — ENCOUNTER SYMPTOMS
ABDOMINAL PAIN: 0
BACK PAIN: 0
COUGH: 0
DIARRHEA: 0
CHILLS: 0
EYE DISCHARGE: 0
TINGLING: 0
FALLS: 0
SORE THROAT: 0
HEADACHES: 0
VOMITING: 0
SENSORY CHANGE: 0
FEVER: 0
JOINT SWELLING: 0
EYE REDNESS: 0

## 2018-06-25 NOTE — PROGRESS NOTES
"Subjective:      Keshia Eaton is a 52 y.o. female who presents with Finger Pain (left thumb painful and swollen x 3days)            Pt. Is 51 y/o female who presents with left thumb/nail pain after biting off a \"hang nail\" and then developing slight pus next to the nail the following day. Pt denies any fevers, significant erythema or noted edema. She denies hx of DMII .  Of note patient's BP is elevated today- she does report that she did not take her Aldactone yet today- although pt. Takes such for acne. She reports that she rarely has BP this high.   Denies any HA, CP, or lightheadedness.         Nail Problem   This is a new problem. Episode onset: 3 days ago. The problem occurs every several days. The problem has been gradually worsening. Pertinent negatives include no abdominal pain, chills, congestion, coughing, fever, headaches, joint swelling, rash, sore throat or vomiting. Exacerbated by: Pressure over the thumb. Treatments tried: soaking.       Review of Systems   Constitutional: Negative for chills, fever and malaise/fatigue.   HENT: Negative for congestion and sore throat.    Eyes: Negative for discharge and redness.   Respiratory: Negative for cough.    Gastrointestinal: Negative for abdominal pain, diarrhea and vomiting.   Musculoskeletal: Positive for joint pain. Negative for back pain, falls and joint swelling.   Skin: Negative for rash.   Neurological: Negative for tingling, sensory change and headaches.   All other systems reviewed and are negative.         Objective:     /98   Pulse 91   Temp 36.8 °C (98.3 °F)   Ht 1.575 m (5' 2\")   Wt 78.9 kg (174 lb)   SpO2 96%   BMI 31.83 kg/m²    PMH:  has a past medical history of ASTHMA; Breath shortness; Depression; GERD (gastroesophageal reflux disease); Heart burn; Indigestion; Kidney stone; Obstructive sleep apnea; Renal disorder; and Unspecified disorder of thyroid. She also has no past medical history of Diabetes.  MEDS:   Current " Outpatient Prescriptions:   •  spironolactone (ALDACTONE) 100 MG Tab, Take 100 mg by mouth every day., Disp: , Rfl:   •  amoxicillin-clavulanate (AUGMENTIN) 875-125 MG Tab, Take 1 Tab by mouth 2 times a day for 7 days., Disp: 14 Tab, Rfl: 0  •  duloxetine (CYMBALTA) 30 MG Cap DR Particles, Take 30 mg by mouth every day., Disp: , Rfl:   •  Mometasone Furoate (ASMANEX HFA INH), Inhale  by mouth., Disp: , Rfl:   •  thyroid (NATURE-THROID) 65 MG tablet, Take 65 mg by mouth every day., Disp: , Rfl:   •  buPROPion (WELLBUTRIN XL) 300 MG XL tablet, Take 300 mg by mouth every morning., Disp: , Rfl:   •  omeprazole (PRILOSEC) 40 MG delayed-release capsule, Take 40 mg by mouth every day., Disp: , Rfl:   •  Cholecalciferol (VITAMIN D PO), Take 1 Cap by mouth every day., Disp: , Rfl:   •  buPROPion SR (WELLBUTRIN-SR) 150 MG TABLET SR 12 HR sustained-release tablet, Take 150 mg by mouth 2 times a day., Disp: , Rfl:   •  ibuprofen (MOTRIN) 200 MG Tab, Take 600 mg by mouth every 6 hours as needed for Mild Pain., Disp: , Rfl:   •  DiphenhydrAMINE HCl (ALLERGY MEDICINE PO), Take 2 Tabs by mouth as needed (For allergies)., Disp: , Rfl:   •  albuterol (VENTOLIN OR PROVENTIL) 108 (90 BASE) MCG/ACT AERS, Inhale 2 Puffs by mouth every 6 hours as needed for Shortness of Breath., Disp: , Rfl:   ALLERGIES:   Allergies   Allergen Reactions   • Nkda [No Known Drug Allergy]      SURGHX:   Past Surgical History:   Procedure Laterality Date   • CYSTOSCOPY  7/12/2012    Performed by REUBEN SHANNON at [facility]   • URETEROSCOPY  7/12/2012    Performed by REUBEN SHANNON at [facility]   • LASERTRIPSY  7/12/2012    Performed by REUBEN SHANNON at [facility]   • MAMMOPLASTY AUGMENTATION  dec 2008   • OTHER ABDOMINAL SURGERY  dec 2008    scar revision - tummy tuck   • PB ENLARGE BREAST WITH IMPLANT       SOCHX:  reports that she quit smoking about 8 years ago. She has never used smokeless tobacco. She  reports that she drinks alcohol. She reports that she does not use drugs.  FH: Family history was reviewed, no pertinent findings to report    Physical Exam   Constitutional: She is oriented to person, place, and time. She appears well-developed and well-nourished. No distress.   HENT:   Head: Normocephalic and atraumatic.   Eyes: Conjunctivae and EOM are normal. Pupils are equal, round, and reactive to light.   Neck: Normal range of motion. Neck supple. No tracheal deviation present.   Cardiovascular: Normal rate and regular rhythm.    No murmur heard.  Pulmonary/Chest: Effort normal and breath sounds normal. No respiratory distress.   Musculoskeletal: Normal range of motion. She exhibits no edema.        Hands:  Left thumb- noted erythema and slight purulent discharge adjacent to the nail- actively draining. Without significant surrounding erythema or edema. N/V intact distally. FROM of the thumb. Nail intact without damage.        Neurological: She is alert and oriented to person, place, and time. Coordination normal.   Skin: Skin is warm. No rash noted.   Psychiatric: She has a normal mood and affect. Her behavior is normal. Judgment and thought content normal.   Vitals reviewed.              Assessment/Plan:     1. Elevated blood pressure reading  2. Paronychia of finger of left hand  - amoxicillin-clavulanate (AUGMENTIN) 875-125 MG Tab; Take 1 Tab by mouth 2 times a day for 7 days.  Dispense: 14 Tab; Refill: 0     Strongly encouraged the patient to start checking her blood pressure at home-discussed low sodium diet as well. Patient is to follow-up with her PCP for further recheck and possible change to medications.  Also encouraged patient to continue with warm hot soaks-paronychia is currently draining at this time minimally-discussed signs and symptoms that would require an incision and drainage. Will start the patient on Augmentin at this time.Patient given precautionary s/sx that mandate immediate follow  up and evaluation in the ED. Advised of risks of not doing so.    DDX, Supportive care, and indications for immediate follow-up discussed with patient.    Instructed to return to clinic or nearest emergency department if we are not available for any change in condition, further concerns, or worsening of symptoms.    The patient demonstrated a good understanding and agreed with the treatment plan.  Please note that this dictation was created using voice recognition software. I have made every reasonable attempt to correct obvious errors, but I expect that there are errors of grammar and possibly content that I did not discover before finalizing the note.

## 2018-10-05 ENCOUNTER — HOSPITAL ENCOUNTER (OUTPATIENT)
Dept: LAB | Facility: MEDICAL CENTER | Age: 53
End: 2018-10-05
Attending: FAMILY MEDICINE
Payer: COMMERCIAL

## 2018-10-05 LAB
FSH SERPL-ACNC: 37.1 MIU/ML
LH SERPL-ACNC: 22 IU/L
TSH SERPL DL<=0.005 MIU/L-ACNC: 1.66 UIU/ML (ref 0.38–5.33)

## 2018-10-05 PROCEDURE — 84443 ASSAY THYROID STIM HORMONE: CPT

## 2018-10-05 PROCEDURE — 83002 ASSAY OF GONADOTROPIN (LH): CPT

## 2018-10-05 PROCEDURE — 36415 COLL VENOUS BLD VENIPUNCTURE: CPT

## 2018-10-05 PROCEDURE — 83001 ASSAY OF GONADOTROPIN (FSH): CPT

## 2019-08-30 ENCOUNTER — HOSPITAL ENCOUNTER (OUTPATIENT)
Dept: LAB | Facility: MEDICAL CENTER | Age: 54
End: 2019-08-30
Attending: FAMILY MEDICINE
Payer: COMMERCIAL

## 2019-08-30 LAB — TSH SERPL DL<=0.005 MIU/L-ACNC: 1.84 UIU/ML (ref 0.38–5.33)

## 2019-08-30 PROCEDURE — 84443 ASSAY THYROID STIM HORMONE: CPT

## 2019-08-30 PROCEDURE — 36415 COLL VENOUS BLD VENIPUNCTURE: CPT

## 2019-10-12 ENCOUNTER — HOSPITAL ENCOUNTER (OUTPATIENT)
Dept: LAB | Facility: MEDICAL CENTER | Age: 54
End: 2019-10-12
Attending: FAMILY MEDICINE
Payer: COMMERCIAL

## 2019-10-12 LAB
ALBUMIN SERPL BCP-MCNC: 4.2 G/DL (ref 3.2–4.9)
ALBUMIN/GLOB SERPL: 1.4 G/DL
ALP SERPL-CCNC: 138 U/L (ref 30–99)
ALT SERPL-CCNC: 43 U/L (ref 2–50)
ANION GAP SERPL CALC-SCNC: 9 MMOL/L (ref 0–11.9)
AST SERPL-CCNC: 29 U/L (ref 12–45)
BASOPHILS # BLD AUTO: 0.5 % (ref 0–1.8)
BASOPHILS # BLD: 0.03 K/UL (ref 0–0.12)
BILIRUB SERPL-MCNC: 0.3 MG/DL (ref 0.1–1.5)
BUN SERPL-MCNC: 9 MG/DL (ref 8–22)
CALCIUM SERPL-MCNC: 8.9 MG/DL (ref 8.5–10.5)
CHLORIDE SERPL-SCNC: 104 MMOL/L (ref 96–112)
CO2 SERPL-SCNC: 24 MMOL/L (ref 20–33)
CREAT SERPL-MCNC: 0.74 MG/DL (ref 0.5–1.4)
EOSINOPHIL # BLD AUTO: 0.1 K/UL (ref 0–0.51)
EOSINOPHIL NFR BLD: 1.6 % (ref 0–6.9)
ERYTHROCYTE [DISTWIDTH] IN BLOOD BY AUTOMATED COUNT: 41.9 FL (ref 35.9–50)
EST. AVERAGE GLUCOSE BLD GHB EST-MCNC: 329 MG/DL
FASTING STATUS PATIENT QL REPORTED: NORMAL
GLOBULIN SER CALC-MCNC: 2.9 G/DL (ref 1.9–3.5)
GLUCOSE SERPL-MCNC: 346 MG/DL (ref 65–99)
HBA1C MFR BLD: 13.1 % (ref 0–5.6)
HCT VFR BLD AUTO: 41.7 % (ref 37–47)
HGB BLD-MCNC: 12.7 G/DL (ref 12–16)
IMM GRANULOCYTES # BLD AUTO: 0.02 K/UL (ref 0–0.11)
IMM GRANULOCYTES NFR BLD AUTO: 0.3 % (ref 0–0.9)
LYMPHOCYTES # BLD AUTO: 1.71 K/UL (ref 1–4.8)
LYMPHOCYTES NFR BLD: 27 % (ref 22–41)
MCH RBC QN AUTO: 25.4 PG (ref 27–33)
MCHC RBC AUTO-ENTMCNC: 30.5 G/DL (ref 33.6–35)
MCV RBC AUTO: 83.4 FL (ref 81.4–97.8)
MONOCYTES # BLD AUTO: 0.39 K/UL (ref 0–0.85)
MONOCYTES NFR BLD AUTO: 6.2 % (ref 0–13.4)
NEUTROPHILS # BLD AUTO: 4.09 K/UL (ref 2–7.15)
NEUTROPHILS NFR BLD: 64.4 % (ref 44–72)
NRBC # BLD AUTO: 0 K/UL
NRBC BLD-RTO: 0 /100 WBC
PLATELET # BLD AUTO: 377 K/UL (ref 164–446)
PMV BLD AUTO: 11.1 FL (ref 9–12.9)
POTASSIUM SERPL-SCNC: 4.1 MMOL/L (ref 3.6–5.5)
PROT SERPL-MCNC: 7.1 G/DL (ref 6–8.2)
RBC # BLD AUTO: 5 M/UL (ref 4.2–5.4)
SODIUM SERPL-SCNC: 137 MMOL/L (ref 135–145)
TSH SERPL DL<=0.005 MIU/L-ACNC: 1.17 UIU/ML (ref 0.38–5.33)
WBC # BLD AUTO: 6.3 K/UL (ref 4.8–10.8)

## 2019-10-12 PROCEDURE — 84443 ASSAY THYROID STIM HORMONE: CPT

## 2019-10-12 PROCEDURE — 83036 HEMOGLOBIN GLYCOSYLATED A1C: CPT

## 2019-10-12 PROCEDURE — 85025 COMPLETE CBC W/AUTO DIFF WBC: CPT

## 2019-10-12 PROCEDURE — 80053 COMPREHEN METABOLIC PANEL: CPT

## 2019-10-12 PROCEDURE — 36415 COLL VENOUS BLD VENIPUNCTURE: CPT

## 2019-10-26 ENCOUNTER — APPOINTMENT (OUTPATIENT)
Dept: RADIOLOGY | Facility: MEDICAL CENTER | Age: 54
End: 2019-10-26
Attending: EMERGENCY MEDICINE
Payer: COMMERCIAL

## 2019-10-26 ENCOUNTER — HOSPITAL ENCOUNTER (EMERGENCY)
Facility: MEDICAL CENTER | Age: 54
End: 2019-10-26
Attending: EMERGENCY MEDICINE
Payer: COMMERCIAL

## 2019-10-26 VITALS
HEIGHT: 62 IN | BODY MASS INDEX: 30.83 KG/M2 | RESPIRATION RATE: 18 BRPM | SYSTOLIC BLOOD PRESSURE: 125 MMHG | TEMPERATURE: 98.9 F | DIASTOLIC BLOOD PRESSURE: 58 MMHG | OXYGEN SATURATION: 99 % | WEIGHT: 167.55 LBS | HEART RATE: 86 BPM

## 2019-10-26 DIAGNOSIS — N93.8 DYSFUNCTIONAL UTERINE BLEEDING: ICD-10-CM

## 2019-10-26 LAB
ALBUMIN SERPL BCP-MCNC: 4.3 G/DL (ref 3.2–4.9)
ALBUMIN/GLOB SERPL: 1.5 G/DL
ALP SERPL-CCNC: 133 U/L (ref 30–99)
ALT SERPL-CCNC: 44 U/L (ref 2–50)
ANION GAP SERPL CALC-SCNC: 13 MMOL/L (ref 0–11.9)
APPEARANCE UR: CLEAR
APTT PPP: 24.3 SEC (ref 24.7–36)
AST SERPL-CCNC: 36 U/L (ref 12–45)
BASOPHILS # BLD AUTO: 0.3 % (ref 0–1.8)
BASOPHILS # BLD: 0.02 K/UL (ref 0–0.12)
BILIRUB SERPL-MCNC: 0.2 MG/DL (ref 0.1–1.5)
BILIRUB UR QL STRIP.AUTO: NEGATIVE
BUN SERPL-MCNC: 11 MG/DL (ref 8–22)
CALCIUM SERPL-MCNC: 9.2 MG/DL (ref 8.4–10.2)
CHLORIDE SERPL-SCNC: 104 MMOL/L (ref 96–112)
CO2 SERPL-SCNC: 22 MMOL/L (ref 20–33)
COLOR UR: YELLOW
CREAT SERPL-MCNC: 0.6 MG/DL (ref 0.5–1.4)
EOSINOPHIL # BLD AUTO: 0.2 K/UL (ref 0–0.51)
EOSINOPHIL NFR BLD: 3.4 % (ref 0–6.9)
ERYTHROCYTE [DISTWIDTH] IN BLOOD BY AUTOMATED COUNT: 41 FL (ref 35.9–50)
GLOBULIN SER CALC-MCNC: 2.8 G/DL (ref 1.9–3.5)
GLUCOSE SERPL-MCNC: 157 MG/DL (ref 65–99)
GLUCOSE UR STRIP.AUTO-MCNC: NEGATIVE MG/DL
HCG SERPL QL: NEGATIVE
HCT VFR BLD AUTO: 38.9 % (ref 37–47)
HGB BLD-MCNC: 12.3 G/DL (ref 12–16)
IMM GRANULOCYTES # BLD AUTO: 0.02 K/UL (ref 0–0.11)
IMM GRANULOCYTES NFR BLD AUTO: 0.3 % (ref 0–0.9)
INR PPP: 1 (ref 0.87–1.13)
KETONES UR STRIP.AUTO-MCNC: NEGATIVE MG/DL
LEUKOCYTE ESTERASE UR QL STRIP.AUTO: NEGATIVE
LYMPHOCYTES # BLD AUTO: 2.04 K/UL (ref 1–4.8)
LYMPHOCYTES NFR BLD: 34.3 % (ref 22–41)
MCH RBC QN AUTO: 26.1 PG (ref 27–33)
MCHC RBC AUTO-ENTMCNC: 31.6 G/DL (ref 33.6–35)
MCV RBC AUTO: 82.4 FL (ref 81.4–97.8)
MICRO URNS: NORMAL
MONOCYTES # BLD AUTO: 0.41 K/UL (ref 0–0.85)
MONOCYTES NFR BLD AUTO: 6.9 % (ref 0–13.4)
NEUTROPHILS # BLD AUTO: 3.25 K/UL (ref 2–7.15)
NEUTROPHILS NFR BLD: 54.8 % (ref 44–72)
NITRITE UR QL STRIP.AUTO: NEGATIVE
NRBC # BLD AUTO: 0 K/UL
NRBC BLD-RTO: 0 /100 WBC
PH UR STRIP.AUTO: 6 [PH] (ref 5–8)
PLATELET # BLD AUTO: 396 K/UL (ref 164–446)
PMV BLD AUTO: 10.3 FL (ref 9–12.9)
POTASSIUM SERPL-SCNC: 4.2 MMOL/L (ref 3.6–5.5)
PROT SERPL-MCNC: 7.1 G/DL (ref 6–8.2)
PROT UR QL STRIP: NEGATIVE MG/DL
PROTHROMBIN TIME: 13.3 SEC (ref 12–14.6)
RBC # BLD AUTO: 4.72 M/UL (ref 4.2–5.4)
RBC UR QL AUTO: NEGATIVE
SODIUM SERPL-SCNC: 139 MMOL/L (ref 135–145)
SP GR UR STRIP.AUTO: 1.01
WBC # BLD AUTO: 5.9 K/UL (ref 4.8–10.8)

## 2019-10-26 PROCEDURE — 81003 URINALYSIS AUTO W/O SCOPE: CPT

## 2019-10-26 PROCEDURE — 85730 THROMBOPLASTIN TIME PARTIAL: CPT

## 2019-10-26 PROCEDURE — 36415 COLL VENOUS BLD VENIPUNCTURE: CPT

## 2019-10-26 PROCEDURE — 51701 INSERT BLADDER CATHETER: CPT

## 2019-10-26 PROCEDURE — 99284 EMERGENCY DEPT VISIT MOD MDM: CPT

## 2019-10-26 PROCEDURE — 76830 TRANSVAGINAL US NON-OB: CPT

## 2019-10-26 PROCEDURE — 85610 PROTHROMBIN TIME: CPT

## 2019-10-26 PROCEDURE — 84703 CHORIONIC GONADOTROPIN ASSAY: CPT

## 2019-10-26 PROCEDURE — 85025 COMPLETE CBC W/AUTO DIFF WBC: CPT

## 2019-10-26 PROCEDURE — 80053 COMPREHEN METABOLIC PANEL: CPT

## 2019-10-26 RX ORDER — THYROID 60 MG/1
60 TABLET ORAL DAILY
COMMUNITY

## 2019-10-26 RX ORDER — METFORMIN HYDROCHLORIDE 500 MG/1
1000 TABLET, EXTENDED RELEASE ORAL 2 TIMES DAILY
COMMUNITY

## 2019-10-26 RX ORDER — BUPROPION HYDROCHLORIDE 150 MG/1
150 TABLET ORAL EVERY MORNING
COMMUNITY

## 2019-10-26 ASSESSMENT — PAIN SCALES - WONG BAKER: WONGBAKER_NUMERICALRESPONSE: HURTS A LITTLE MORE

## 2019-10-26 NOTE — ED TRIAGE NOTES
"Chief Complaint   Patient presents with   • Vaginal Bleeding     pt been on menopause without period for 2 years. pt c/o vaginal bleeding and cramping for 3 days     /96   Pulse 93   Temp 36.3 °C (97.3 °F) (Temporal)   Resp 20   Ht 1.575 m (5' 2\")   Wt 76 kg (167 lb 8.8 oz)   SpO2 96%   BMI 30.65 kg/m²     "

## 2019-10-26 NOTE — ED PROVIDER NOTES
ED Provider Note    CHIEF COMPLAINT  Chief Complaint   Patient presents with   • Vaginal Bleeding     pt been on menopause without period for 2 years. pt c/o vaginal bleeding and cramping for 3 days        HPI  Keshia Eaton is a 53 y.o. female who presents to the ED with complaints of vaginal bleeding and abdominal cramps.  The patient is postmenopausal for approximately 2 years.  For the past 3 days she is noticed that she is been having some intermittent cramps with intermittent vaginal bleeding.  Patient initially described as brown blood that she did pass a couple of clots.  Patient does describe intermittent abdominal pain denies any fevers chills nausea vomiting dysuria or any other symptoms.  Patient just recently started metformin because she was diagnosed as type II diabetic about 2 to 3 weeks ago.  Patient also has a significant history of hypothyroidism and is been on thyroid medications for several years.    REVIEW OF SYSTEMS  See HPI for further details. All other systems are negative.     PAST MEDICAL HISTORY  Past Medical History:   Diagnosis Date   • ASTHMA    • Breath shortness    • Depression    • GERD (gastroesophageal reflux disease)    • Heart burn    • Indigestion    • Kidney stone    • Obstructive sleep apnea    • Renal disorder     KIDNEY STONE   • Unspecified disorder of thyroid        FAMILY HISTORY  Family History   Problem Relation Age of Onset   • Diabetes Mother    • Stroke Mother    • Hypertension Mother    • Stroke Father      Patient's family history has been discussed and is been found to be noncontributory to his present illness  SOCIAL HISTORY  Social History     Socioeconomic History   • Marital status:      Spouse name: Not on file   • Number of children: Not on file   • Years of education: Not on file   • Highest education level: Not on file   Occupational History   • Not on file   Social Needs   • Financial resource strain: Not on file   • Food insecurity:      Worry: Not on file     Inability: Not on file   • Transportation needs:     Medical: Not on file     Non-medical: Not on file   Tobacco Use   • Smoking status: Former Smoker     Last attempt to quit: 2010     Years since quittin.8   • Smokeless tobacco: Never Used   • Tobacco comment: 1 month ago   Substance and Sexual Activity   • Alcohol use: Yes     Comment: occ   • Drug use: No   • Sexual activity: Yes     Partners: Male   Lifestyle   • Physical activity:     Days per week: Not on file     Minutes per session: Not on file   • Stress: Not on file   Relationships   • Social connections:     Talks on phone: Not on file     Gets together: Not on file     Attends Restoration service: Not on file     Active member of club or organization: Not on file     Attends meetings of clubs or organizations: Not on file     Relationship status: Not on file   • Intimate partner violence:     Fear of current or ex partner: Not on file     Emotionally abused: Not on file     Physically abused: Not on file     Forced sexual activity: Not on file   Other Topics Concern   • Not on file   Social History Narrative   • Not on file      JORGE Willoughby    SURGICAL HISTORY  Past Surgical History:   Procedure Laterality Date   • CYSTOSCOPY  2012    Performed by REUBEN SHANNON at SURGERY [facility] ORS   • URETEROSCOPY  2012    Performed by REUBEN SHANNON at SURGERY [facility] ORS   • LASERTRIPSY  2012    Performed by REUBEN SHANNON at SURGERY [facility] ORS   • MAMMOPLASTY AUGMENTATION  dec 2008   • OTHER ABDOMINAL SURGERY  dec 2008    scar revision - tummy tuck   • PB ENLARGE BREAST WITH IMPLANT         CURRENT MEDICATIONS  Home Medications     Reviewed by Woodrow Matos (Pharmacy Tech) on 10/26/19 at 1118  Med List Status: Complete   Medication Last Dose Status   albuterol (VENTOLIN OR PROVENTIL) 108 (90 BASE) MCG/ACT AERS PRN Active   buPROPion (WELLBUTRIN XL) 150 MG XL tablet 10/26/2019  Active   buPROPion (WELLBUTRIN XL) 300 MG XL tablet 10/25/2019 Active   Cholecalciferol (VITAMIN D) 2000 units Cap 10/25/2019 Active   duloxetine (CYMBALTA) 30 MG Cap DR Particles 10/26/2019 Active   ibuprofen (MOTRIN) 200 MG Tab 10/25/2019 Active   metFORMIN ER (GLUCOPHAGE XR) 500 MG TABLET SR 24 HR 10/25/2019 Active   mometasone (ASMANEX 120 METERED DOSES) 220 MCG/INH inhaler 10/25/2019 Active   omeprazole (PRILOSEC) 40 MG delayed-release capsule 10/26/2019 Active   Semaglutide (OZEMPIC) 0.25 or 0.5 MG/DOSE Solution Pen-injector 10/24/2019 Active   thyroid (ARMOUR THYROID) 60 MG Tab 10/26/2019 Active              No current facility-administered medications on file prior to encounter.      Current Outpatient Medications on File Prior to Encounter   Medication Sig Dispense Refill   • buPROPion (WELLBUTRIN XL) 150 MG XL tablet Take 150 mg by mouth every morning. Take 150 mg tab with 300 mg tab for a total dose of 450 mg daily     • mometasone (ASMANEX 120 METERED DOSES) 220 MCG/INH inhaler Inhale 1 Puff by mouth every day.     • metFORMIN ER (GLUCOPHAGE XR) 500 MG TABLET SR 24 HR Take 500 mg by mouth every day.     • thyroid (ARMOUR THYROID) 60 MG Tab Take 60 mg by mouth every day.     • Semaglutide (OZEMPIC) 0.25 or 0.5 MG/DOSE Solution Pen-injector Inject 0.25 mg as instructed every thursday. Inject 0.25 mg weekly for 4 weeks the 0.5 mg weekly from then on     • duloxetine (CYMBALTA) 30 MG Cap DR Particles Take 30 mg by mouth every day.     • buPROPion (WELLBUTRIN XL) 300 MG XL tablet Take 300 mg by mouth every morning. Take 300 mg tab with 150 mg tab for a total dose of 450 mg daily     • omeprazole (PRILOSEC) 40 MG delayed-release capsule Take 40 mg by mouth every day.     • Cholecalciferol (VITAMIN D) 2000 units Cap Take 2,000 Units by mouth every day.     • ibuprofen (MOTRIN) 200 MG Tab Take 600 mg by mouth every 6 hours as needed for Mild Pain.     • albuterol (VENTOLIN OR PROVENTIL) 108 (90 BASE) MCG/ACT  "AERS Inhale 2 Puffs by mouth every 6 hours as needed for Shortness of Breath.           ALLERGIES  Allergies   Allergen Reactions   • Nkda [No Known Drug Allergy]        PHYSICAL EXAM  VITAL SIGNS: /58   Pulse 86   Temp 37.2 °C (98.9 °F) (Temporal)   Resp 18   Ht 1.575 m (5' 2\")   Wt 76 kg (167 lb 8.8 oz)   SpO2 99%   BMI 30.65 kg/m²    Pulse Oximetry was obtained. It showed a reading of Pulse Oximetry: 96 %.  I interpreted this as nonhypoxic.     Constitutional: Well developed, Well nourished, No acute distress, Non-toxic appearance.   HENT: Normocephalic, Atraumatic, Bilateral external ears normal, bilateral tympanic membranes normal, Oropharynx moist mucous membranes, No oral exudates, Nose normal.   Eyes:  conjunctiva is normal, there are no signs of exudate.   Neck: Supple, no cervical lymphadenopathy, no meningeal signs..   Lymphatic: No lymphadenopathy noted.   Cardiovascular: Regular rate and rhythm without murmurs gallops or rubs.   Thorax & Lungs: Lungs are clear to auscultation bilaterally, there are no wheezes no rales. Chest wall is nontender.  Abdomen: Soft, nontender nondistended. Bowel sounds are present.   Skin: Warm, Dry, No erythema,   Back: No tenderness, No CVA tenderness.  Musculoskeletal: Good range of motion in all major joints. No tenderness to palpation or major deformities noted. Intact distal pulses, no clubbing, no cyanosis, no edema     Neurologic: Alert & oriented x 3, Normal motor function, Normal sensory function, No focal deficits noted.   Psychiatric: Affect normal, Judgment normal, Mood normal.         RADIOLOGY/PROCEDURES  US-PELVIC TRANSVAGINAL ONLY   Final Result      1.7 cm right ovarian cyst, likely benign. Consider follow-up ultrasound in one year. Society of radiologists in ultrasound guideline recommendations          Results for orders placed or performed during the hospital encounter of 10/26/19   CBC WITH DIFFERENTIAL   Result Value Ref Range    WBC 5.9 4.8 " - 10.8 K/uL    RBC 4.72 4.20 - 5.40 M/uL    Hemoglobin 12.3 12.0 - 16.0 g/dL    Hematocrit 38.9 37.0 - 47.0 %    MCV 82.4 81.4 - 97.8 fL    MCH 26.1 (L) 27.0 - 33.0 pg    MCHC 31.6 (L) 33.6 - 35.0 g/dL    RDW 41.0 35.9 - 50.0 fL    Platelet Count 396 164 - 446 K/uL    MPV 10.3 9.0 - 12.9 fL    Neutrophils-Polys 54.80 44.00 - 72.00 %    Lymphocytes 34.30 22.00 - 41.00 %    Monocytes 6.90 0.00 - 13.40 %    Eosinophils 3.40 0.00 - 6.90 %    Basophils 0.30 0.00 - 1.80 %    Immature Granulocytes 0.30 0.00 - 0.90 %    Nucleated RBC 0.00 /100 WBC    Neutrophils (Absolute) 3.25 2.00 - 7.15 K/uL    Lymphs (Absolute) 2.04 1.00 - 4.80 K/uL    Monos (Absolute) 0.41 0.00 - 0.85 K/uL    Eos (Absolute) 0.20 0.00 - 0.51 K/uL    Baso (Absolute) 0.02 0.00 - 0.12 K/uL    Immature Granulocytes (abs) 0.02 0.00 - 0.11 K/uL    NRBC (Absolute) 0.00 K/uL   COMP METABOLIC PANEL   Result Value Ref Range    Sodium 139 135 - 145 mmol/L    Potassium 4.2 3.6 - 5.5 mmol/L    Chloride 104 96 - 112 mmol/L    Co2 22 20 - 33 mmol/L    Anion Gap 13.0 (H) 0.0 - 11.9    Glucose 157 (H) 65 - 99 mg/dL    Bun 11 8 - 22 mg/dL    Creatinine 0.60 0.50 - 1.40 mg/dL    Calcium 9.2 8.4 - 10.2 mg/dL    AST(SGOT) 36 12 - 45 U/L    ALT(SGPT) 44 2 - 50 U/L    Alkaline Phosphatase 133 (H) 30 - 99 U/L    Total Bilirubin 0.2 0.1 - 1.5 mg/dL    Albumin 4.3 3.2 - 4.9 g/dL    Total Protein 7.1 6.0 - 8.2 g/dL    Globulin 2.8 1.9 - 3.5 g/dL    A-G Ratio 1.5 g/dL   PROTHROMBIN TIME (INR)   Result Value Ref Range    PT 13.3 12.0 - 14.6 sec    INR 1.00 0.87 - 1.13   APTT   Result Value Ref Range    APTT 24.3 (L) 24.7 - 36.0 sec   HCG QUAL SERUM   Result Value Ref Range    Beta-Hcg Qualitative Serum Negative Negative   URINALYSIS,CULTURE IF INDICATED   Result Value Ref Range    Color Yellow     Character Clear     Specific Gravity 1.015 <1.035    Ph 6.0 5.0 - 8.0    Glucose Negative Negative mg/dL    Ketones Negative Negative mg/dL    Protein Negative Negative mg/dL    Bilirubin  Negative Negative    Nitrite Negative Negative    Leukocyte Esterase Negative Negative    Occult Blood Negative Negative    Micro Urine Req see below    ESTIMATED GFR   Result Value Ref Range    GFR If African American >60 >60 mL/min/1.73 m 2    GFR If Non African American >60 >60 mL/min/1.73 m 2         COURSE & MEDICAL DECISION MAKING  Pertinent Labs & Imaging studies reviewed. (See chart for details)  Patient presents emerged department for evaluation.  Clinically the patient appears well has no significant physical findings.  Ultrasound as above findings.  H&H is normal.  At this point the patient does have abnormal uterine bleeding.  I recommended for the patient to follow-up with her gynecologist for further outpatient evaluation and care.  Patient should follow-up with the [facility] if she has any overt hemorrhaging or worsening symptoms.  The patient understands we will do as above.    FINAL IMPRESSION  1. Dysfunctional uterine bleeding              The patient will return for new or worsening symptoms and is stable at the time of discharge.    The patient is referred to a primary physician for blood pressure management, diabetic screening, and for all other preventative health concerns.        DISPOSITION:  Patient will be discharged home in stable condition.    FOLLOW UP:  [facility]  [address]  [place] [address]  [phone]        Rona Florentino M.D.  [address]  [address]  [place] [address]  [phone]    Schedule an appointment as soon as possible for a visit in 1 week  For re-check, Return if any symptoms worsen      OUTPATIENT MEDICATIONS:  Discharge Medication List as of 10/26/2019  1:12 PM            Electronically signed by: Nilton Kelley, 10/26/2019 10:45 AM

## 2019-10-26 NOTE — ED NOTES
Med rec completed per pt   Allergies reviewed  No PO antibiotics in the last 14 days    Pt takes Wellbutrin   Pt takes 150 mg tab with a 300 mg tab for a total dose of 450 mg daily     Pt started taking Ozempic 2 weeks ago   Inject 0.25 mg weekly for 4 weeks the 0.5 mg weekly from then on

## 2019-10-26 NOTE — ED NOTES
DC instructions reviewed. Verbalizes understanding. NAD. VSS. Denies further need at this time. Ambulated to exit with a steady gait.

## 2019-10-29 ENCOUNTER — GYNECOLOGY VISIT (OUTPATIENT)
Dept: OBGYN | Facility: MEDICAL CENTER | Age: 54
End: 2019-10-29
Payer: COMMERCIAL

## 2019-10-29 VITALS — DIASTOLIC BLOOD PRESSURE: 78 MMHG | BODY MASS INDEX: 30.54 KG/M2 | SYSTOLIC BLOOD PRESSURE: 122 MMHG | WEIGHT: 167 LBS

## 2019-10-29 DIAGNOSIS — N83.201 CYST OF RIGHT OVARY: ICD-10-CM

## 2019-10-29 PROCEDURE — 99202 OFFICE O/P NEW SF 15 MIN: CPT | Performed by: OBSTETRICS & GYNECOLOGY

## 2019-10-29 NOTE — PROGRESS NOTES
Chief Complaint   Patient presents with   • New Patient       History of present illness: 53 y.o. presents with postmenopausal spotting here for follow up visit. Pt started light brown spotting last Thursday when she wiped on the toilet associated with light uterine cramping. She then wore a pad which she did see additional spotting overnight. On Friday she had one episode of bright red discharge on the toilet paper when she wiped but the rest of the discharge was brown. Saturday night she had more cramping pain and went to the Ed. TVUS on 10/26 showed a 0.25cm stripe with a 1.7cm simple right ovarian cyst. The spotting had since resolved .  Denies recent sex. She has been diagnosed with DM2 in the past year or so and is taking metformin and semaglutide.     Review of systems:  Pertinent positives documented in HPI and all other systems reviewed & are negative    Constitutional: negative  Respiratory: negative  Cardiovascular: negative  Gastrointestinal: negative  Genitourinary:negative  Integument/breast: negative  Hematologic/lymphatic: negative  Musculoskeletal:negative  Neurological: negative  Behavioral/Psych: negative  Endocrine: negative  Allergic/Immunologic: negative     PGYNHx: Menopausal since 2017 without any bleeding. Male partner for past 11 years.     POBHx:   OB History    Para Term  AB Living   5 2 2   3 2   SAB TAB Ectopic Molar Multiple Live Births             2      # Outcome Date GA Lbr Sergey/2nd Weight Sex Delivery Anes PTL Lv   5 Term 99 40w0d  3.345 kg (7 lb 6 oz) M Vag-Spont   EKATERINA   4 Term 91 40w0d  4.366 kg (9 lb 10 oz) M Vag-Spont   EKATERINA   3 AB            2 AB            1 AB                All PMH, PSH, allergies, social history and FH reviewed and updated today:  Past Medical History:   Diagnosis Date   • ASTHMA    • Breath shortness    • Depression    • Diabetes (HCC)    • GERD (gastroesophageal reflux disease)    • Heart burn    • Indigestion    • Kidney stone     • Obstructive sleep apnea    • Renal disorder     KIDNEY STONE   • Unspecified disorder of thyroid        Past Surgical History:   Procedure Laterality Date   • CYSTOSCOPY  2012    Performed by REUBEN SHANNON at SURGERY [facility] ORS   • URETEROSCOPY  2012    Performed by REUBEN SHANNON at SURGERY [facility] ORS   • LASERTRIPSY  2012    Performed by REUBEN SHANNON at SURGERY [facility] ORS   • MAMMOPLASTY AUGMENTATION  dec 2008   • OTHER ABDOMINAL SURGERY  dec 2008    scar revision - tummy tuck   • PB ENLARGE BREAST WITH IMPLANT         Allergies:   Allergies   Allergen Reactions   • Nkda [No Known Drug Allergy]        Social History     Socioeconomic History   • Marital status:      Spouse name: Not on file   • Number of children: Not on file   • Years of education: Not on file   • Highest education level: Not on file   Occupational History   • Not on file   Social Needs   • Financial resource strain: Not on file   • Food insecurity:     Worry: Not on file     Inability: Not on file   • Transportation needs:     Medical: Not on file     Non-medical: Not on file   Tobacco Use   • Smoking status: Former Smoker     Last attempt to quit: 2010     Years since quittin.8   • Smokeless tobacco: Never Used   • Tobacco comment: 1 month ago   Substance and Sexual Activity   • Alcohol use: Yes     Comment: occ   • Drug use: No   • Sexual activity: Yes     Partners: Male   Lifestyle   • Physical activity:     Days per week: Not on file     Minutes per session: Not on file   • Stress: Not on file   Relationships   • Social connections:     Talks on phone: Not on file     Gets together: Not on file     Attends Shinto service: Not on file     Active member of club or organization: Not on file     Attends meetings of clubs or organizations: Not on file     Relationship status: Not on file   • Intimate partner violence:     Fear of current or ex partner: Not on file     Emotionally  abused: Not on file     Physically abused: Not on file     Forced sexual activity: Not on file   Other Topics Concern   • Not on file   Social History Narrative   • Not on file       Family History   Problem Relation Age of Onset   • Diabetes Mother    • Stroke Mother    • Hypertension Mother    • Stroke Father        Physical exam:  /78   Wt 75.8 kg (167 lb)     General:appears stated age, is in no apparent distress, is well developed and well nourished  Head: normocephalic, non-tender  Neck: neck is supple, no jugular venous distension  Skin: No rashes, or ulcers or lesions seen  Psychiatric: Patient shows appropriate affect, is alert and oriented x3, intact judgment and insight.    Assessment  1. Cyst of right ovary  US-PELVIC TRANSVAGINAL ONLY     Plan  - 53 y.o.  here with postmenopausal spotting, thin endometrium   - Discussed most common cause of PMB is atrophy and with a very thin endometrium on US, at this point would recommend watching - if has additional episode of bleeding would have patient return for EMB.   - Ovarian cyst: discussed this has all radiological signs of being a benign cyst. Recommend repeatt US in 5-6 months and then return to reassess.   - Patient has mammogram already scheduled  - Patient to return to office on or around 2020 for f/u appointment. Would have her do repeat US prior to this appointment.   - Follow up March or sooner if bleeds  - Patient was seen for 20 minutes of which > 50% of appointment time was spent on face-to-face counseling and coordination of care regarding the above.

## 2019-10-29 NOTE — NON-PROVIDER
Pt here for her New pt visit  LMP: 10/2017  Pt here to f/u on US- pelvic 10/26/19 showed 1.7 cm cyst on  R ovary  States off and cramps and spotting since 10/24/19  Good# [phone]  Pharmacy confirmed   Last mammogram: 02/2017  Last pap: 2017 at Private practice

## 2019-11-02 ENCOUNTER — HOSPITAL ENCOUNTER (OUTPATIENT)
Dept: LAB | Facility: MEDICAL CENTER | Age: 54
End: 2019-11-02
Attending: PHYSICIAN ASSISTANT
Payer: COMMERCIAL

## 2019-11-02 LAB
25(OH)D3 SERPL-MCNC: 33 NG/ML (ref 30–100)
ALBUMIN SERPL BCP-MCNC: 4.2 G/DL (ref 3.2–4.9)
ALBUMIN/GLOB SERPL: 1.3 G/DL
ALP SERPL-CCNC: 126 U/L (ref 30–99)
ALT SERPL-CCNC: 46 U/L (ref 2–50)
ANION GAP SERPL CALC-SCNC: 8 MMOL/L (ref 0–11.9)
AST SERPL-CCNC: 28 U/L (ref 12–45)
BILIRUB SERPL-MCNC: 0.3 MG/DL (ref 0.1–1.5)
BUN SERPL-MCNC: 9 MG/DL (ref 8–22)
CALCIUM SERPL-MCNC: 8.9 MG/DL (ref 8.5–10.5)
CHLORIDE SERPL-SCNC: 109 MMOL/L (ref 96–112)
CHOLEST SERPL-MCNC: 192 MG/DL (ref 100–199)
CO2 SERPL-SCNC: 22 MMOL/L (ref 20–33)
CREAT SERPL-MCNC: 0.72 MG/DL (ref 0.5–1.4)
CREAT UR-MCNC: 69.9 MG/DL
FASTING STATUS PATIENT QL REPORTED: NORMAL
GLOBULIN SER CALC-MCNC: 3.3 G/DL (ref 1.9–3.5)
GLUCOSE SERPL-MCNC: 147 MG/DL (ref 65–99)
HDLC SERPL-MCNC: 30 MG/DL
LDLC SERPL CALC-MCNC: 135 MG/DL
MICROALBUMIN UR-MCNC: 0.7 MG/DL
MICROALBUMIN/CREAT UR: 10 MG/G (ref 0–30)
POTASSIUM SERPL-SCNC: 4.1 MMOL/L (ref 3.6–5.5)
PROT SERPL-MCNC: 7.5 G/DL (ref 6–8.2)
SODIUM SERPL-SCNC: 139 MMOL/L (ref 135–145)
TRIGL SERPL-MCNC: 133 MG/DL (ref 0–149)

## 2019-11-02 PROCEDURE — 82306 VITAMIN D 25 HYDROXY: CPT

## 2019-11-02 PROCEDURE — 82570 ASSAY OF URINE CREATININE: CPT

## 2019-11-02 PROCEDURE — 82043 UR ALBUMIN QUANTITATIVE: CPT

## 2019-11-02 PROCEDURE — 36415 COLL VENOUS BLD VENIPUNCTURE: CPT

## 2019-11-02 PROCEDURE — 80053 COMPREHEN METABOLIC PANEL: CPT

## 2019-11-02 PROCEDURE — 80061 LIPID PANEL: CPT

## 2019-11-26 ENCOUNTER — NON-PROVIDER VISIT (OUTPATIENT)
Dept: HEALTH INFORMATION MANAGEMENT | Facility: MEDICAL CENTER | Age: 54
End: 2019-11-26
Payer: COMMERCIAL

## 2019-11-26 DIAGNOSIS — E11.65 UNCONTROLLED TYPE 2 DIABETES MELLITUS WITH HYPERGLYCEMIA (HCC): ICD-10-CM

## 2019-11-26 PROCEDURE — G0109 DIAB MANAGE TRN IND/GROUP: HCPCS | Performed by: INTERNAL MEDICINE

## 2019-11-26 NOTE — LETTER
November 26, 2019      Rona Florentino M.D.  [address]  [address]  [place] [address]          RE: Keshia Eaton  [id number]    Dear:Rona Florentino M.D.    The above referenced patient received 3 hours of diabetes education from [facility].    Topics taught (may include but not limited to):  Introduction to diabetes, benefits and responsibilities of patient, physiology of diabetes and the diease process, benefits of blood glucose monitoring and record keeping, medication action and possible side effects, hypoglycemia, sick day management, exercise, stress reduction and travel with diabetes.     Provided meter and instructed in use: no. Pt  Testing her blood sugars one time per day  Dilated eye exam within the past year: yes Goal is for patient to have yearly.   Lipid panel:   Lab Results   Component Value Date/Time    CHOLSTRLTOT 192 11/02/2019 08:22 AM     (H) 11/02/2019 08:22 AM    HDL 30 (A) 11/02/2019 08:22 AM    TRIGLYCERIDE 133 11/02/2019 08:22 AM      Micro-albumin/Creat. Ratio: 10  Date: 11/2/19  Goal is less than 20 ng/dl  HbA1c:   Lab Results   Component Value Date/Time    HBA1C 13.1 (H) 10/12/2019 08:51 AM    Goal is <7 in the next 3 months.    Patient/caregiver appeared to understand the content as demonstrated by appropriate questions.   The patient was provided with written materials to back-up verbal education.   Thank you for this consultation,     Doris Priest R.N.,SOHAME  Certified Diabetes Nurse Educator

## 2019-11-26 NOTE — PROGRESS NOTES
Keshia attended day one of the Diabetes Self Management Class.   She was diagnosed with diabetes this past October when she was having symptoms of polyuria, polydipsia and weight loss  She is currently on Metformin and Ozempic    Diabetes Education Content    Introduction To Diabetes   Define Type 2 diabetes: Education taught  Define Type 1 diabetes: Education taught  Understand feaures and benefits of education and management: Education taught  Describe who is responsible for diabetes management: Education taught  Describe impact of diabetes on family/friends: Education taught  Discuss role of significant other in management: Education taught  Diabetes Lifestyle Changes / Goals  State benefits of making appropriate lifestyle changes: Education taught  Identify lifestyle behaviors participant wants to change: Education taught  Identify risk factors that interfere with health and strategies to reduce : Education taught  Verbalize need for and frequency of health care follow-up: Education taught  Develop behavioral objectives and expected health outcomes: Education taught  Diabetes Exercise and Activity  Describe role of exercise in diabetes management: Education taught  State relationship of exercise to blood sugar: Education taught  State the benefits/risk(s) of exercise and precautions to follow: Education taught  Diabetes Self Blood Glucose Monitoring  Discuss rationale and importance of SBGM: Education taught  Discuss appropriate record keeping: Education taught  Discuss how to use results from blood glucose testing: Education taught  Evaluation and interpretation of blood glucose patterns: Education taught  Diabetes Disease Process  Discuss signs, symptoms, TX and prevention of hyperglycemia: Education taught  Discuss beta cell dysfunction and insulin resistance: Education taught  Discuss Insulin and its role in the body: Education taught  Discuss the role of the liver in glucose metabolism: Education  "taught  Discuss hormonal regulation: Education taught  Define benefits of good control and discuss what it means to be in \"good control\": Education taught  Discuss impact of exercise, food, meds, stress, and special factors on diabetes: Education taught  Discuss when to confer with HCP for possible treatment plan adjustments: Education taught  Diabetes Insulin and Medications  Action of oral medications, onset and duration: Education taught  Discuss incretin secretagogs and their use in diabetes management: Education taught  Identify the onset, peak and duration of different insulin: Education taught  Discuss proper injection technique and site rotation: Education taught  Discuss storage of insulin and disposal of sharps: Education taught  Hypoglycemia  List signs, symptoms and causes of hypoglycemia: Education taught  Discuss physiology of hypoglycemic reactions: Education taught  Accurately describe appropriate treatment and prevention: Education taught  Discuss when to contact HCP: Education taught  Sick Day Care  Verbalize important items to monitor when sick and when to contact HCP: Education taught  Review sick day box: Education taught  State diabetes medication adjustments on sick days: Education taught  Complications (Chronic)  Explain prevention, TX, signs/symptoms of: retinopathy, neuropathy, nephropathy, infections: Education taught  Explain prevention, TX, signs/symptoms of: CAD, cerebral-vascular disease and sexual dysfunction: Education taught  Identify when to notify HCP of complications: Education taught  State principles of skin, dental and foot care.  Discuss proper foot care, prevention of foot probelms when to notify HCP>: Education taught  Demonstrate how to examine feet and what to look for: Education taught  Discuss immunizations: Education taught  Psychosocial adjustment  Identify sources of stress: Education taught  Identify resources and techniques for stress reduction: Education " taught  Discuss health care referral network / community resources for support: Education taught  Define proper precautions to use when traveling: Education taught     Define proper precautions to use for emergency preparedness: Education taught  Discuss emergency preparedness with diabetes: Education taught

## 2019-11-27 ENCOUNTER — NON-PROVIDER VISIT (OUTPATIENT)
Dept: HEALTH INFORMATION MANAGEMENT | Facility: MEDICAL CENTER | Age: 54
End: 2019-11-27
Payer: COMMERCIAL

## 2019-11-27 DIAGNOSIS — E11.65 UNCONTROLLED TYPE 2 DIABETES MELLITUS WITH HYPERGLYCEMIA (HCC): ICD-10-CM

## 2019-11-27 PROCEDURE — G0109 DIAB MANAGE TRN IND/GROUP: HCPCS | Performed by: DIETITIAN, REGISTERED

## 2019-11-27 NOTE — LETTER
No referring provider defined for this encounter.               November 27, 2019    Dear:No ref. provider found      Your patient Keshia Eaton 1965 was recently seen at [facility] for nutrition counseling, regarding T2DM.    The patient was taught the basics of nutrition, carbohydrate containing foods vs non carbohydrate containing foods, the plate method for portion control, nutrition facts label reading, heart healthy eating, and more. The patient was encouraged to exercise for at least 30 minutes 5 days per week. They were given a number of resources available to them.     Should you desire any notes from this visit please do not hesitate to give us a call at [phone].      TYPE 2 CLASS  Radha Lovelace RD,LD  [facility]  [phone]  [email or web address]

## 2019-11-28 NOTE — PROGRESS NOTES
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   Time in/out: 9:00-11:15 am     Subjective:  -Here for day 2 Nutrition part of Type 2 Diabetes class     Nutrition Diagnosis (PES Statement)  · Altered nutrition related lab values related to endocrine dysfunction as evidenced by HgbA1c of 13.1%    Client history:  Condition(s) associated with a diagnosis or treatment (specify) T2DM    Biochemical data, medical test and procedures  Lab Results   Component Value Date/Time    HBA1C 13.1 (H) 10/12/2019 08:51 AM   @  No results found for: POCGLUCOSE  Lab Results   Component Value Date/Time    CHOLSTRLTOT 192 11/02/2019 08:22 AM     (H) 11/02/2019 08:22 AM    HDL 30 (A) 11/02/2019 08:22 AM    TRIGLYCERIDE 133 11/02/2019 08:22 AM         Nutrition Intervention    Comprehensive Nutrition education Instruction or training leading to in-depth nutrition related knowledge about:  Benefits to following meal plan, Combine carb, protein and fat at each meal, Eating out, Fast food, Meal timing and spacing, Menu Planning, Metabolism of carb, protein, fat, Physical activity/exercise, Portion control, Sweets and alcohol in moderation, Heart-healthy guidelines, Label Reading, Handouts provided regarding topics discussed and Theraputic diet for Type 2 DM    Monitoring & Evaluation Plan    Behavioral-Environmental:  Behavior:  Consistent CHO intake throughout the day  Physical activity:  Increase as tolerated    Food / Nutrient Intake:  Food intake:  Use the plate method recommendations for portions/balance at meals  Macronutrient intake:  ~30-45 grams CHO with meals for women & 45-60 grams CHO for men, up to 15 grams CHO with snacks    Physical Signs / Symptoms:  HbA1c profiles:  Within ADA guidelines      Assessment Notes:  Keshia attended day 2 of the Type 2 class today.  She was taught that exercise works as a medication for controlling DM.  Different exercises were shown that can be done easily at home, like walking in place, lifting  light weights while sitting, etc.  It was emphasized that if exercise is a consistent part of Keshia’s habits that DM control will be the most ideal it can be.   Food was then discussed next, with a brief review of Keshia’s current eating habits.  She was taught the differences between CHO/protein/fat and their effects on BG’s.  This information was related to the plate method to help with meal planning/portions at meals; Keshia was also taught to use their hands as measuring tools (fist for starch, palm for protein) to help when eating out or on a large plate.  Non-starchy vegetables were emphasized as foods that will help satisfy without raising BG’s at meals and snacks.  I stressed to the patient to not go more than 4 hours without eating and if a snack is necessary she was taught how to construct a proper snack of ~15 grams CHO and ~7 grams protein.  Finally, we moved onto the food label and how to effectively read a label using serving size, trans fat, total CHO, and % sodium to evaluate a food.  An alternative way of meal planning was given by using the food label and CHO counting; the patient can eat 30-45 grams CHO at meals and up to 15 grams CHO at snacks, if needed. It was emphasized to aim for lower end of CHO range and adjust according to her blood sugar readings.  Keshia set goals to try to achieve in the next 3 months to help with DM control; she can follow-up with me, if needed, for a more intensive meal plan and CHO counting education.  F/u prn.

## 2020-01-31 ENCOUNTER — HOSPITAL ENCOUNTER (OUTPATIENT)
Dept: RADIOLOGY | Facility: MEDICAL CENTER | Age: 55
End: 2020-01-31
Attending: FAMILY MEDICINE
Payer: COMMERCIAL

## 2020-01-31 DIAGNOSIS — Z12.31 VISIT FOR SCREENING MAMMOGRAM: ICD-10-CM

## 2020-01-31 PROCEDURE — 77067 SCR MAMMO BI INCL CAD: CPT

## 2020-02-08 ENCOUNTER — HOSPITAL ENCOUNTER (OUTPATIENT)
Dept: LAB | Facility: MEDICAL CENTER | Age: 55
End: 2020-02-08
Attending: FAMILY MEDICINE
Payer: COMMERCIAL

## 2020-02-08 LAB
CHOLEST SERPL-MCNC: 132 MG/DL (ref 100–199)
EST. AVERAGE GLUCOSE BLD GHB EST-MCNC: 148 MG/DL
FASTING STATUS PATIENT QL REPORTED: NORMAL
GLUCOSE SERPL-MCNC: 114 MG/DL (ref 65–99)
HBA1C MFR BLD: 6.8 % (ref 0–5.6)
HDLC SERPL-MCNC: 34 MG/DL
LDLC SERPL CALC-MCNC: 81 MG/DL
TRIGL SERPL-MCNC: 84 MG/DL (ref 0–149)

## 2020-02-08 PROCEDURE — 36415 COLL VENOUS BLD VENIPUNCTURE: CPT

## 2020-02-08 PROCEDURE — 83036 HEMOGLOBIN GLYCOSYLATED A1C: CPT

## 2020-02-08 PROCEDURE — 80061 LIPID PANEL: CPT

## 2020-02-08 PROCEDURE — 82947 ASSAY GLUCOSE BLOOD QUANT: CPT

## 2020-03-13 ENCOUNTER — HOSPITAL ENCOUNTER (OUTPATIENT)
Dept: LAB | Facility: MEDICAL CENTER | Age: 55
End: 2020-03-13
Attending: PHYSICIAN ASSISTANT
Payer: COMMERCIAL

## 2020-03-13 LAB
ALBUMIN SERPL BCP-MCNC: 4.7 G/DL (ref 3.2–4.9)
ALP SERPL-CCNC: 81 U/L (ref 30–99)
ALT SERPL-CCNC: 19 U/L (ref 2–50)
AST SERPL-CCNC: 15 U/L (ref 12–45)
BILIRUB CONJ SERPL-MCNC: <0.1 MG/DL (ref 0.1–0.5)
BILIRUB INDIRECT SERPL-MCNC: NORMAL MG/DL (ref 0–1)
BILIRUB SERPL-MCNC: 0.3 MG/DL (ref 0.1–1.5)
GGT SERPL-CCNC: 21 U/L (ref 7–34)
PROT SERPL-MCNC: 7.7 G/DL (ref 6–8.2)

## 2020-03-13 PROCEDURE — 82977 ASSAY OF GGT: CPT

## 2020-03-13 PROCEDURE — 86038 ANTINUCLEAR ANTIBODIES: CPT

## 2020-03-13 PROCEDURE — 83516 IMMUNOASSAY NONANTIBODY: CPT

## 2020-03-13 PROCEDURE — 86803 HEPATITIS C AB TEST: CPT

## 2020-03-13 PROCEDURE — 36415 COLL VENOUS BLD VENIPUNCTURE: CPT

## 2020-03-13 PROCEDURE — 80076 HEPATIC FUNCTION PANEL: CPT

## 2020-03-14 LAB — HCV AB SER QL: NORMAL

## 2020-03-15 LAB
MITOCHONDRIA M2 IGG SER-ACNC: 5.7 UNITS (ref 0–20)
NUCLEAR IGG SER QL IA: NORMAL

## 2020-04-29 ENCOUNTER — HOSPITAL ENCOUNTER (OUTPATIENT)
Dept: RADIOLOGY | Facility: MEDICAL CENTER | Age: 55
End: 2020-04-29
Attending: OBSTETRICS & GYNECOLOGY
Payer: COMMERCIAL

## 2020-04-29 ENCOUNTER — HOSPITAL ENCOUNTER (OUTPATIENT)
Dept: RADIOLOGY | Facility: MEDICAL CENTER | Age: 55
End: 2020-04-29
Attending: PHYSICIAN ASSISTANT
Payer: COMMERCIAL

## 2020-04-29 DIAGNOSIS — N83.201 CYST OF RIGHT OVARY: ICD-10-CM

## 2020-04-29 DIAGNOSIS — R11.2 NAUSEA AND VOMITING, INTRACTABILITY OF VOMITING NOT SPECIFIED, UNSPECIFIED VOMITING TYPE: ICD-10-CM

## 2020-04-29 DIAGNOSIS — K21.9 GASTROESOPHAGEAL REFLUX DISEASE, ESOPHAGITIS PRESENCE NOT SPECIFIED: ICD-10-CM

## 2020-04-29 DIAGNOSIS — R74.8 ELEVATED LIVER ENZYMES: ICD-10-CM

## 2020-04-29 DIAGNOSIS — R14.0 BLOATING: ICD-10-CM

## 2020-04-29 PROCEDURE — 76705 ECHO EXAM OF ABDOMEN: CPT

## 2020-04-29 PROCEDURE — 76830 TRANSVAGINAL US NON-OB: CPT

## 2020-06-12 ENCOUNTER — HOSPITAL ENCOUNTER (OUTPATIENT)
Dept: RADIOLOGY | Facility: MEDICAL CENTER | Age: 55
End: 2020-06-12
Attending: INTERNAL MEDICINE
Payer: COMMERCIAL

## 2020-06-12 DIAGNOSIS — K21.9 GASTROESOPHAGEAL REFLUX DISEASE, ESOPHAGITIS PRESENCE NOT SPECIFIED: ICD-10-CM

## 2020-06-12 DIAGNOSIS — R11.2 NAUSEA AND VOMITING, INTRACTABILITY OF VOMITING NOT SPECIFIED, UNSPECIFIED VOMITING TYPE: ICD-10-CM

## 2020-06-12 DIAGNOSIS — R10.13 ABDOMINAL PAIN, EPIGASTRIC: ICD-10-CM

## 2020-06-12 PROCEDURE — A9541 TC99M SULFUR COLLOID: HCPCS

## 2020-07-11 ENCOUNTER — HOSPITAL ENCOUNTER (OUTPATIENT)
Dept: LAB | Facility: MEDICAL CENTER | Age: 55
End: 2020-07-11
Attending: FAMILY MEDICINE
Payer: COMMERCIAL

## 2020-07-11 LAB
ALBUMIN SERPL BCP-MCNC: 4.6 G/DL (ref 3.2–4.9)
ALBUMIN/GLOB SERPL: 1.6 G/DL
ALP SERPL-CCNC: 93 U/L (ref 30–99)
ALT SERPL-CCNC: 25 U/L (ref 2–50)
ANION GAP SERPL CALC-SCNC: 12 MMOL/L (ref 7–16)
AST SERPL-CCNC: 15 U/L (ref 12–45)
BASOPHILS # BLD AUTO: 0.5 % (ref 0–1.8)
BASOPHILS # BLD: 0.04 K/UL (ref 0–0.12)
BILIRUB SERPL-MCNC: 0.3 MG/DL (ref 0.1–1.5)
BUN SERPL-MCNC: 10 MG/DL (ref 8–22)
CALCIUM SERPL-MCNC: 9.8 MG/DL (ref 8.5–10.5)
CHLORIDE SERPL-SCNC: 101 MMOL/L (ref 96–112)
CHOLEST SERPL-MCNC: 119 MG/DL (ref 100–199)
CO2 SERPL-SCNC: 24 MMOL/L (ref 20–33)
CREAT SERPL-MCNC: 0.77 MG/DL (ref 0.5–1.4)
CREAT UR-MCNC: 178.32 MG/DL
EOSINOPHIL # BLD AUTO: 0.25 K/UL (ref 0–0.51)
EOSINOPHIL NFR BLD: 3.3 % (ref 0–6.9)
ERYTHROCYTE [DISTWIDTH] IN BLOOD BY AUTOMATED COUNT: 44.9 FL (ref 35.9–50)
EST. AVERAGE GLUCOSE BLD GHB EST-MCNC: 128 MG/DL
GLOBULIN SER CALC-MCNC: 2.9 G/DL (ref 1.9–3.5)
GLUCOSE SERPL-MCNC: 127 MG/DL (ref 65–99)
HBA1C MFR BLD: 6.1 % (ref 0–5.6)
HCT VFR BLD AUTO: 40.5 % (ref 37–47)
HDLC SERPL-MCNC: 32 MG/DL
HGB BLD-MCNC: 12.9 G/DL (ref 12–16)
IMM GRANULOCYTES # BLD AUTO: 0.01 K/UL (ref 0–0.11)
IMM GRANULOCYTES NFR BLD AUTO: 0.1 % (ref 0–0.9)
LDLC SERPL CALC-MCNC: 69 MG/DL
LYMPHOCYTES # BLD AUTO: 2.41 K/UL (ref 1–4.8)
LYMPHOCYTES NFR BLD: 31.7 % (ref 22–41)
MCH RBC QN AUTO: 26.4 PG (ref 27–33)
MCHC RBC AUTO-ENTMCNC: 31.9 G/DL (ref 33.6–35)
MCV RBC AUTO: 82.8 FL (ref 81.4–97.8)
MICROALBUMIN UR-MCNC: <1.2 MG/DL
MICROALBUMIN/CREAT UR: NORMAL MG/G (ref 0–30)
MONOCYTES # BLD AUTO: 0.46 K/UL (ref 0–0.85)
MONOCYTES NFR BLD AUTO: 6.1 % (ref 0–13.4)
NEUTROPHILS # BLD AUTO: 4.43 K/UL (ref 2–7.15)
NEUTROPHILS NFR BLD: 58.3 % (ref 44–72)
NRBC # BLD AUTO: 0 K/UL
NRBC BLD-RTO: 0 /100 WBC
PLATELET # BLD AUTO: 458 K/UL (ref 164–446)
PMV BLD AUTO: 10.2 FL (ref 9–12.9)
POTASSIUM SERPL-SCNC: 4.2 MMOL/L (ref 3.6–5.5)
PROT SERPL-MCNC: 7.5 G/DL (ref 6–8.2)
RBC # BLD AUTO: 4.89 M/UL (ref 4.2–5.4)
SODIUM SERPL-SCNC: 137 MMOL/L (ref 135–145)
TRIGL SERPL-MCNC: 92 MG/DL (ref 0–149)
TSH SERPL DL<=0.005 MIU/L-ACNC: 1.28 UIU/ML (ref 0.38–5.33)
WBC # BLD AUTO: 7.6 K/UL (ref 4.8–10.8)

## 2020-07-11 PROCEDURE — 80061 LIPID PANEL: CPT

## 2020-07-11 PROCEDURE — 80053 COMPREHEN METABOLIC PANEL: CPT

## 2020-07-11 PROCEDURE — 36415 COLL VENOUS BLD VENIPUNCTURE: CPT

## 2020-07-11 PROCEDURE — 84443 ASSAY THYROID STIM HORMONE: CPT

## 2020-07-11 PROCEDURE — 82043 UR ALBUMIN QUANTITATIVE: CPT

## 2020-07-11 PROCEDURE — 82570 ASSAY OF URINE CREATININE: CPT

## 2020-07-11 PROCEDURE — 83036 HEMOGLOBIN GLYCOSYLATED A1C: CPT

## 2020-07-11 PROCEDURE — 85025 COMPLETE CBC W/AUTO DIFF WBC: CPT

## 2021-01-16 ENCOUNTER — HOSPITAL ENCOUNTER (OUTPATIENT)
Dept: LAB | Facility: MEDICAL CENTER | Age: 56
End: 2021-01-16
Attending: FAMILY MEDICINE
Payer: COMMERCIAL

## 2021-01-16 LAB
EST. AVERAGE GLUCOSE BLD GHB EST-MCNC: 140 MG/DL
HBA1C MFR BLD: 6.5 % (ref 0–5.6)

## 2021-01-16 PROCEDURE — 36415 COLL VENOUS BLD VENIPUNCTURE: CPT

## 2021-01-16 PROCEDURE — 83036 HEMOGLOBIN GLYCOSYLATED A1C: CPT

## 2021-06-26 ENCOUNTER — HOSPITAL ENCOUNTER (OUTPATIENT)
Dept: LAB | Facility: MEDICAL CENTER | Age: 56
End: 2021-06-26
Attending: FAMILY MEDICINE
Payer: COMMERCIAL

## 2021-06-26 LAB
EST. AVERAGE GLUCOSE BLD GHB EST-MCNC: 140 MG/DL
HBA1C MFR BLD: 6.5 % (ref 4–5.6)
TSH SERPL DL<=0.005 MIU/L-ACNC: 0.98 UIU/ML (ref 0.38–5.33)

## 2021-06-26 PROCEDURE — 84443 ASSAY THYROID STIM HORMONE: CPT

## 2021-06-26 PROCEDURE — 36415 COLL VENOUS BLD VENIPUNCTURE: CPT

## 2021-06-26 PROCEDURE — 83036 HEMOGLOBIN GLYCOSYLATED A1C: CPT

## 2021-08-20 ENCOUNTER — HOSPITAL ENCOUNTER (OUTPATIENT)
Dept: RADIOLOGY | Facility: MEDICAL CENTER | Age: 56
End: 2021-08-20
Attending: FAMILY MEDICINE
Payer: COMMERCIAL

## 2021-08-20 DIAGNOSIS — Z12.31 VISIT FOR SCREENING MAMMOGRAM: ICD-10-CM

## 2021-08-20 PROCEDURE — 77063 BREAST TOMOSYNTHESIS BI: CPT

## 2021-09-17 ENCOUNTER — HOSPITAL ENCOUNTER (OUTPATIENT)
Dept: LAB | Facility: MEDICAL CENTER | Age: 56
End: 2021-09-17
Attending: FAMILY MEDICINE
Payer: COMMERCIAL

## 2021-09-17 LAB
ALBUMIN SERPL BCP-MCNC: 4.5 G/DL (ref 3.2–4.9)
ALBUMIN/GLOB SERPL: 1.6 G/DL
ALP SERPL-CCNC: 101 U/L (ref 30–99)
ALT SERPL-CCNC: 18 U/L (ref 2–50)
ANION GAP SERPL CALC-SCNC: 16 MMOL/L (ref 7–16)
AST SERPL-CCNC: 12 U/L (ref 12–45)
BASOPHILS # BLD AUTO: 0.3 % (ref 0–1.8)
BASOPHILS # BLD: 0.02 K/UL (ref 0–0.12)
BILIRUB SERPL-MCNC: 0.2 MG/DL (ref 0.1–1.5)
BUN SERPL-MCNC: 15 MG/DL (ref 8–22)
CALCIUM SERPL-MCNC: 10.2 MG/DL (ref 8.5–10.5)
CHLORIDE SERPL-SCNC: 103 MMOL/L (ref 96–112)
CHOLEST SERPL-MCNC: 118 MG/DL (ref 100–199)
CO2 SERPL-SCNC: 22 MMOL/L (ref 20–33)
CREAT SERPL-MCNC: 0.65 MG/DL (ref 0.5–1.4)
CREAT UR-MCNC: 206.82 MG/DL
EOSINOPHIL # BLD AUTO: 0.18 K/UL (ref 0–0.51)
EOSINOPHIL NFR BLD: 2.7 % (ref 0–6.9)
ERYTHROCYTE [DISTWIDTH] IN BLOOD BY AUTOMATED COUNT: 45.9 FL (ref 35.9–50)
EST. AVERAGE GLUCOSE BLD GHB EST-MCNC: 134 MG/DL
FASTING STATUS PATIENT QL REPORTED: NORMAL
GLOBULIN SER CALC-MCNC: 2.8 G/DL (ref 1.9–3.5)
GLUCOSE SERPL-MCNC: 123 MG/DL (ref 65–99)
HBA1C MFR BLD: 6.3 % (ref 4–5.6)
HCT VFR BLD AUTO: 37 % (ref 37–47)
HDLC SERPL-MCNC: 31 MG/DL
HGB BLD-MCNC: 11.7 G/DL (ref 12–16)
IMM GRANULOCYTES # BLD AUTO: 0.02 K/UL (ref 0–0.11)
IMM GRANULOCYTES NFR BLD AUTO: 0.3 % (ref 0–0.9)
LDLC SERPL CALC-MCNC: 71 MG/DL
LYMPHOCYTES # BLD AUTO: 1.88 K/UL (ref 1–4.8)
LYMPHOCYTES NFR BLD: 28.3 % (ref 22–41)
MCH RBC QN AUTO: 26.1 PG (ref 27–33)
MCHC RBC AUTO-ENTMCNC: 31.6 G/DL (ref 33.6–35)
MCV RBC AUTO: 82.4 FL (ref 81.4–97.8)
MICROALBUMIN UR-MCNC: 1.3 MG/DL
MICROALBUMIN/CREAT UR: 6 MG/G (ref 0–30)
MONOCYTES # BLD AUTO: 0.54 K/UL (ref 0–0.85)
MONOCYTES NFR BLD AUTO: 8.1 % (ref 0–13.4)
NEUTROPHILS # BLD AUTO: 4 K/UL (ref 2–7.15)
NEUTROPHILS NFR BLD: 60.3 % (ref 44–72)
NRBC # BLD AUTO: 0 K/UL
NRBC BLD-RTO: 0 /100 WBC
PLATELET # BLD AUTO: 450 K/UL (ref 164–446)
PMV BLD AUTO: 10.7 FL (ref 9–12.9)
POTASSIUM SERPL-SCNC: 4 MMOL/L (ref 3.6–5.5)
PROT SERPL-MCNC: 7.3 G/DL (ref 6–8.2)
RBC # BLD AUTO: 4.49 M/UL (ref 4.2–5.4)
SODIUM SERPL-SCNC: 141 MMOL/L (ref 135–145)
TRIGL SERPL-MCNC: 78 MG/DL (ref 0–149)
WBC # BLD AUTO: 6.6 K/UL (ref 4.8–10.8)

## 2021-09-17 PROCEDURE — 82570 ASSAY OF URINE CREATININE: CPT

## 2021-09-17 PROCEDURE — 80053 COMPREHEN METABOLIC PANEL: CPT

## 2021-09-17 PROCEDURE — 82043 UR ALBUMIN QUANTITATIVE: CPT

## 2021-09-17 PROCEDURE — 85025 COMPLETE CBC W/AUTO DIFF WBC: CPT

## 2021-09-17 PROCEDURE — 80061 LIPID PANEL: CPT

## 2021-09-17 PROCEDURE — 83036 HEMOGLOBIN GLYCOSYLATED A1C: CPT

## 2021-09-17 PROCEDURE — 36415 COLL VENOUS BLD VENIPUNCTURE: CPT

## 2021-09-20 PROBLEM — M25.531 ACUTE PAIN OF RIGHT WRIST: Status: ACTIVE | Noted: 2021-09-20

## 2021-11-24 ENCOUNTER — HOSPITAL ENCOUNTER (OUTPATIENT)
Facility: MEDICAL CENTER | Age: 56
End: 2021-11-24
Attending: ANESTHESIOLOGY
Payer: COMMERCIAL

## 2021-11-24 LAB
COVID ORDER STATUS COVID19: NORMAL
SARS-COV-2 RNA RESP QL NAA+PROBE: NOTDETECTED
SPECIMEN SOURCE: NORMAL

## 2021-11-24 PROCEDURE — U0003 INFECTIOUS AGENT DETECTION BY NUCLEIC ACID (DNA OR RNA); SEVERE ACUTE RESPIRATORY SYNDROME CORONAVIRUS 2 (SARS-COV-2) (CORONAVIRUS DISEASE [COVID-19]), AMPLIFIED PROBE TECHNIQUE, MAKING USE OF HIGH THROUGHPUT TECHNOLOGIES AS DESCRIBED BY CMS-2020-01-R: HCPCS

## 2021-11-24 PROCEDURE — U0005 INFEC AGEN DETEC AMPLI PROBE: HCPCS

## 2022-01-03 ENCOUNTER — HOSPITAL ENCOUNTER (OUTPATIENT)
Dept: LAB | Facility: MEDICAL CENTER | Age: 57
End: 2022-01-03
Attending: FAMILY MEDICINE
Payer: COMMERCIAL

## 2022-01-03 LAB
EST. AVERAGE GLUCOSE BLD GHB EST-MCNC: 137 MG/DL
HBA1C MFR BLD: 6.4 % (ref 4–5.6)

## 2022-01-03 PROCEDURE — 36415 COLL VENOUS BLD VENIPUNCTURE: CPT

## 2022-01-03 PROCEDURE — 83036 HEMOGLOBIN GLYCOSYLATED A1C: CPT

## 2022-02-16 ENCOUNTER — APPOINTMENT (OUTPATIENT)
Dept: RADIOLOGY | Facility: MEDICAL CENTER | Age: 57
End: 2022-02-16
Attending: EMERGENCY MEDICINE
Payer: COMMERCIAL

## 2022-02-16 ENCOUNTER — HOSPITAL ENCOUNTER (EMERGENCY)
Facility: MEDICAL CENTER | Age: 57
End: 2022-02-16
Attending: EMERGENCY MEDICINE
Payer: COMMERCIAL

## 2022-02-16 VITALS
TEMPERATURE: 97.6 F | HEART RATE: 100 BPM | DIASTOLIC BLOOD PRESSURE: 79 MMHG | WEIGHT: 161.6 LBS | BODY MASS INDEX: 29.74 KG/M2 | RESPIRATION RATE: 16 BRPM | SYSTOLIC BLOOD PRESSURE: 130 MMHG | HEIGHT: 62 IN | OXYGEN SATURATION: 95 %

## 2022-02-16 DIAGNOSIS — N20.0 KIDNEY STONE: ICD-10-CM

## 2022-02-16 DIAGNOSIS — R11.0 NAUSEA: ICD-10-CM

## 2022-02-16 DIAGNOSIS — R10.31 RIGHT LOWER QUADRANT ABDOMINAL PAIN: ICD-10-CM

## 2022-02-16 LAB
ALBUMIN SERPL BCP-MCNC: 4.5 G/DL (ref 3.2–4.9)
ALBUMIN/GLOB SERPL: 1.4 G/DL
ALP SERPL-CCNC: 94 U/L (ref 30–99)
ALT SERPL-CCNC: 22 U/L (ref 2–50)
ANION GAP SERPL CALC-SCNC: 18 MMOL/L (ref 7–16)
APPEARANCE UR: ABNORMAL
AST SERPL-CCNC: 17 U/L (ref 12–45)
BACTERIA #/AREA URNS HPF: NEGATIVE /HPF
BASOPHILS # BLD AUTO: 0.1 % (ref 0–1.8)
BASOPHILS # BLD: 0.01 K/UL (ref 0–0.12)
BILIRUB SERPL-MCNC: 0.3 MG/DL (ref 0.1–1.5)
BILIRUB UR QL STRIP.AUTO: NEGATIVE
BUN SERPL-MCNC: 12 MG/DL (ref 8–22)
CALCIUM SERPL-MCNC: 10.2 MG/DL (ref 8.4–10.2)
CHLORIDE SERPL-SCNC: 103 MMOL/L (ref 96–112)
CO2 SERPL-SCNC: 18 MMOL/L (ref 20–33)
COLOR UR: YELLOW
CREAT SERPL-MCNC: 0.8 MG/DL (ref 0.5–1.4)
EOSINOPHIL # BLD AUTO: 0.08 K/UL (ref 0–0.51)
EOSINOPHIL NFR BLD: 0.8 % (ref 0–6.9)
EPI CELLS #/AREA URNS HPF: NEGATIVE /HPF
ERYTHROCYTE [DISTWIDTH] IN BLOOD BY AUTOMATED COUNT: 45.5 FL (ref 35.9–50)
GLOBULIN SER CALC-MCNC: 3.3 G/DL (ref 1.9–3.5)
GLUCOSE SERPL-MCNC: 157 MG/DL (ref 65–99)
GLUCOSE UR STRIP.AUTO-MCNC: NEGATIVE MG/DL
HCT VFR BLD AUTO: 36.1 % (ref 37–47)
HGB BLD-MCNC: 11.9 G/DL (ref 12–16)
HYALINE CASTS #/AREA URNS LPF: ABNORMAL /LPF
IMM GRANULOCYTES # BLD AUTO: 0.04 K/UL (ref 0–0.11)
IMM GRANULOCYTES NFR BLD AUTO: 0.4 % (ref 0–0.9)
KETONES UR STRIP.AUTO-MCNC: 40 MG/DL
LEUKOCYTE ESTERASE UR QL STRIP.AUTO: NEGATIVE
LIPASE SERPL-CCNC: 28 U/L (ref 7–58)
LYMPHOCYTES # BLD AUTO: 1.24 K/UL (ref 1–4.8)
LYMPHOCYTES NFR BLD: 12.5 % (ref 22–41)
MCH RBC QN AUTO: 26.2 PG (ref 27–33)
MCHC RBC AUTO-ENTMCNC: 33 G/DL (ref 33.6–35)
MCV RBC AUTO: 79.5 FL (ref 81.4–97.8)
MICRO URNS: ABNORMAL
MONOCYTES # BLD AUTO: 0.44 K/UL (ref 0–0.85)
MONOCYTES NFR BLD AUTO: 4.4 % (ref 0–13.4)
NEUTROPHILS # BLD AUTO: 8.12 K/UL (ref 2–7.15)
NEUTROPHILS NFR BLD: 81.8 % (ref 44–72)
NITRITE UR QL STRIP.AUTO: NEGATIVE
NRBC # BLD AUTO: 0 K/UL
NRBC BLD-RTO: 0 /100 WBC
PH UR STRIP.AUTO: 7 [PH] (ref 5–8)
PLATELET # BLD AUTO: 428 K/UL (ref 164–446)
PMV BLD AUTO: 10.3 FL (ref 9–12.9)
POTASSIUM SERPL-SCNC: 3.7 MMOL/L (ref 3.6–5.5)
PROT SERPL-MCNC: 7.8 G/DL (ref 6–8.2)
PROT UR QL STRIP: NEGATIVE MG/DL
RBC # BLD AUTO: 4.54 M/UL (ref 4.2–5.4)
RBC # URNS HPF: ABNORMAL /HPF
RBC UR QL AUTO: ABNORMAL
SODIUM SERPL-SCNC: 139 MMOL/L (ref 135–145)
SP GR UR STRIP.AUTO: 1.01
WBC # BLD AUTO: 9.9 K/UL (ref 4.8–10.8)
WBC #/AREA URNS HPF: ABNORMAL /HPF

## 2022-02-16 PROCEDURE — 99284 EMERGENCY DEPT VISIT MOD MDM: CPT

## 2022-02-16 PROCEDURE — 700105 HCHG RX REV CODE 258: Performed by: EMERGENCY MEDICINE

## 2022-02-16 PROCEDURE — 700111 HCHG RX REV CODE 636 W/ 250 OVERRIDE (IP): Performed by: EMERGENCY MEDICINE

## 2022-02-16 PROCEDURE — 74176 CT ABD & PELVIS W/O CONTRAST: CPT

## 2022-02-16 PROCEDURE — 80053 COMPREHEN METABOLIC PANEL: CPT

## 2022-02-16 PROCEDURE — 83690 ASSAY OF LIPASE: CPT

## 2022-02-16 PROCEDURE — 96374 THER/PROPH/DIAG INJ IV PUSH: CPT

## 2022-02-16 PROCEDURE — 81001 URINALYSIS AUTO W/SCOPE: CPT

## 2022-02-16 PROCEDURE — 85025 COMPLETE CBC W/AUTO DIFF WBC: CPT

## 2022-02-16 PROCEDURE — 36415 COLL VENOUS BLD VENIPUNCTURE: CPT

## 2022-02-16 RX ORDER — SODIUM CHLORIDE 9 MG/ML
1000 INJECTION, SOLUTION INTRAVENOUS ONCE
Status: COMPLETED | OUTPATIENT
Start: 2022-02-16 | End: 2022-02-16

## 2022-02-16 RX ORDER — KETOROLAC TROMETHAMINE 30 MG/ML
15 INJECTION, SOLUTION INTRAMUSCULAR; INTRAVENOUS ONCE
Status: COMPLETED | OUTPATIENT
Start: 2022-02-16 | End: 2022-02-16

## 2022-02-16 RX ORDER — ONDANSETRON 4 MG/1
4 TABLET, ORALLY DISINTEGRATING ORAL EVERY 8 HOURS PRN
Qty: 10 TABLET | Refills: 0 | Status: SHIPPED | OUTPATIENT
Start: 2022-02-16

## 2022-02-16 RX ORDER — OXYCODONE HYDROCHLORIDE 5 MG/1
5 TABLET ORAL EVERY 6 HOURS PRN
Qty: 10 TABLET | Refills: 0 | Status: SHIPPED | OUTPATIENT
Start: 2022-02-16 | End: 2022-02-19

## 2022-02-16 RX ADMIN — SODIUM CHLORIDE 1000 ML: 9 INJECTION, SOLUTION INTRAVENOUS at 03:45

## 2022-02-16 RX ADMIN — KETOROLAC TROMETHAMINE 15 MG: 30 INJECTION, SOLUTION INTRAMUSCULAR at 03:45

## 2022-02-16 ASSESSMENT — FIBROSIS 4 INDEX: FIB4 SCORE: 0.34

## 2022-02-16 NOTE — ED TRIAGE NOTES
"Chief Complaint   Patient presents with   • Abdominal Pain     Lower to R sided abd pain that began at midnight. States it woke her up and she thought she had to go to the restroom and took a fiber pill. States that after having a BM the pain worsened on the R side. \"I feel like I have to go but can't\". Took zofran at home for nausea with relief.      /85   Pulse 100   Temp 36.4 °C (97.6 °F) (Oral)   Resp 18   Ht 1.575 m (5' 2\")   Wt 73.3 kg (161 lb 9.6 oz)   LMP 11/18/2016   SpO2 96%   BMI 29.56 kg/m²     "

## 2022-02-16 NOTE — ED PROVIDER NOTES
"ED Provider Note      Means of Arrival: Private Vehicle  History obtained from: Patient    CHIEF COMPLAINT  Chief Complaint   Patient presents with   • Abdominal Pain     Lower to R sided abd pain that began at midnight. States it woke her up and she thought she had to go to the restroom and took a fiber pill. States that after having a BM the pain worsened on the R side. \"I feel like I have to go but can't\". Took zofran at home for nausea with relief.        HPI  Keshia Eaton is a 56 y.o. female who presents with sudden onset abdominal pain.  The patient was awoken from her sleep approximately midnight with abdominal pain in the mid abdomen that has migrated towards the right lateral abdomen.  She reports associated nausea.  She took some oxycodone as well as ondansetron for her symptoms which have improved and.  She denies any dysuria or preceding hematuria although reports her urine is currently dark on the sample she just gave us.  She denies any fevers.  There are no alleviating or aggravating factors.  The pain is severe and constant.  She does have remote history of kidney stones and has required lithotripsy for them in the past.  The pain occasionally radiates down her right leg.    REVIEW OF SYSTEMS  CONSTITUTIONAL:  No fever.  CARDIOVASCULAR:  No chest discomfort.  RESPIRATORY:  No pleuritic chest pain.  GASTROINTESTINAL:   See HPI  GENITOURINARY:   See HPI      See HPI for further details.   All other systems are negative.     PAST MEDICAL HISTORY  Past Medical History:   Diagnosis Date   • ASTHMA    • Breath shortness    • Depression    • Diabetes (HCC)    • GERD (gastroesophageal reflux disease)    • Heart burn    • Indigestion    • Kidney stone    • Obstructive sleep apnea    • Renal disorder     KIDNEY STONE   • Unspecified disorder of thyroid        FAMILY HISTORY  Family History   Problem Relation Age of Onset   • Diabetes Mother    • Stroke Mother    • Hypertension Mother    • Stroke Father  "       SOCIAL HISTORY   reports that she quit smoking about 12 years ago. She has never used smokeless tobacco. She reports current alcohol use. She reports that she does not use drugs.    SURGICAL HISTORY  Past Surgical History:   Procedure Laterality Date   • PB REMOVAL OF PBOX ROW CARPAL BONES Right 11/30/2021    Procedure: RIGHT WRIST PROXIMAL ROW CARPECTOMY;  Surgeon: Freya Isabel M.D.;  Location: [facility];  Service: Orthopedics   • CYSTOSCOPY  7/12/2012    Performed by REUBEN SHANNON at SURGERY [facility] ORS   • URETEROSCOPY  7/12/2012    Performed by REUBEN SHANNON at SURGERY [facility] ORS   • LASERTRIPSY  7/12/2012    Performed by REUBEN SHANNON at SURGERY [facility] ORS   • MAMMOPLASTY AUGMENTATION  dec 2008   • OTHER ABDOMINAL SURGERY  dec 2008    scar revision - tummy terryck   • OH BREAST AUGMENTATION WITH IMPLANT         CURRENT MEDICATIONS  Home Medications     Reviewed by George Rojas R.N. (Registered Nurse) on 02/16/22 at 0308  Med List Status: <None>   Medication Last Dose Status   albuterol (VENTOLIN OR PROVENTIL) 108 (90 BASE) MCG/ACT AERS  Active   atorvastatin (LIPITOR) 10 MG Tab  Active   buPROPion (WELLBUTRIN XL) 150 MG XL tablet  Active   buPROPion (WELLBUTRIN XL) 300 MG XL tablet  Active   Cholecalciferol (VITAMIN D) 2000 units Cap  Active   duloxetine (CYMBALTA) 30 MG Cap DR Particles  Active   ibuprofen (MOTRIN) 200 MG Tab  Active   meloxicam (MOBIC) 15 MG tablet  Active   metFORMIN ER (GLUCOPHAGE XR) 500 MG TABLET SR 24 HR  Active   mometasone (ASMANEX 120 METERED DOSES) 220 MCG/INH inhaler  Active   omeprazole (PRILOSEC) 40 MG delayed-release capsule  Active   ondansetron (ZOFRAN) 4 MG Tab tablet  Active   Semaglutide (OZEMPIC) 0.25 or 0.5 MG/DOSE Solution Pen-injector  Active   thyroid (ARMOUR THYROID) 60 MG Tab  Active                ALLERGIES  Allergies   Allergen Reactions   • Nkda [No Known Drug Allergy]        PHYSICAL EXAM  VITAL SIGNS: BP  "145/85   Pulse 100   Temp 36.4 °C (97.6 °F) (Oral)   Resp 18   Ht 1.575 m (5' 2\")   Wt 73.3 kg (161 lb 9.6 oz)   LMP 11/18/2016   SpO2 96%   BMI 29.56 kg/m²    Gen: Alert  HENT: ATNC, dry mucous membranes  Eyes: Normal conjunctiva  Neck: trachea midline  Resp: no respiratory distress  CV: No JVD, RRR  Abd: non-distended, soft, nontender  : No CVA tenderness  Ext: No deformities  Psych: normal mood  Neuro: speech fluent       RADIOLOGY/PROCEDURES  CT-RENAL COLIC EVALUATION(A/P W/O)   Final Result      1.  6 mm, obstructing, mid right ureteral stone with moderate right hydronephrosis.   2.  Multiple additional, small nonobstructing bilateral renal stones.   3.  Mild left hydronephrosis, which may be due to the distended urinary bladder. If there is clinical concern for distal obstruction, post void ultrasound can be obtained. No obstructing left-sided stone is seen.   4.  Small/moderate hiatal hernia.          LABS  Labs Reviewed   CBC WITH DIFFERENTIAL - Abnormal; Notable for the following components:       Result Value    Hemoglobin 11.9 (*)     Hematocrit 36.1 (*)     MCV 79.5 (*)     MCH 26.2 (*)     MCHC 33.0 (*)     Neutrophils-Polys 81.80 (*)     Lymphocytes 12.50 (*)     Neutrophils (Absolute) 8.12 (*)     All other components within normal limits   COMP METABOLIC PANEL - Abnormal; Notable for the following components:    Co2 18 (*)     Anion Gap 18.0 (*)     Glucose 157 (*)     All other components within normal limits   URINALYSIS,CULTURE IF INDICATED - Abnormal; Notable for the following components:    Character Cloudy (*)     Ketones 40 (*)     Occult Blood Large (*)     All other components within normal limits    Narrative:     Indication for culture:->Patient WITHOUT an indwelling Brady  catheter in place with new onset of Dysuria, Frequency,  Urgency, and/or Suprapubic pain   URINE MICROSCOPIC (W/UA) - Abnormal; Notable for the following components:    -150 (*)     All other components " within normal limits    Narrative:     Indication for culture:->Patient WITHOUT an indwelling Brady  catheter in place with new onset of Dysuria, Frequency,  Urgency, and/or Suprapubic pain   LIPASE   ESTIMATED GFR          COURSE & MEDICAL DECISION MAKING  Pertinent Labs & Imaging studies reviewed. (See chart for details)    Patient presents with sudden onset severe abdominal pain.  Urinalysis demonstrates hematuria.  Patient has required surgical intervention for stones in the past, will proceed with CAT scan to identify size and location of presumed stone, as well as rule out alternative diagnoses, such as AAA.  Low suspicion for acute appendicitis.  Low suspicion for ovarian torsion.    Hydration: Patient received IV fluids for dehydration.  After IV fluids, patient improved.    CAT scan demonstrates 6 mm mid ureteral stone.  The patient has reassuring labs and urinalysis.  No evidence of infection.  We discussed potential expedited management with potential hospitalization versus outpatient treatment.  Using shared decision-making, the patient prefers trial of passage and outpatient treatment.    In prescribing controlled substances to this patient, I certify that I have obtained and reviewed the medical history of Keshia Eaton. I have also made a good deirdre effort to obtain applicable records from other providers who have treated the patient and records did not demonstrate any increased risk of substance abuse that would prevent me from prescribing controlled substances.     I have conducted a physical exam and documented it. I have reviewed Ms. Eaton’s prescription history as maintained by the [place] Prescription Monitoring Program.     I have assessed the patient’s risk for abuse, dependency, and addiction using the validated Opioid Risk Tool available at https://www.mdcalc.com/eegehr-xbvs-uvgy-ort-narcotic-abuse.     Given the above, I believe the benefits of controlled substance therapy outweigh  the risks. The reasons for prescribing controlled substances include non-narcotic, oral analgesic alternatives have been inadequate for pain control. Accordingly, I have discussed the risk and benefits, treatment plan, and alternative therapies with the patient.   The risks of the medication, including constipation, addiction and altered mental status were discussed with the patient and they expressed understanding. They were encouraged to use the minimum dose necessary to control their breakthrough pain.  Other        The patient was given return precautions, anticipatory guidance, and the opportunity to ask questions prior to discharge.     Appropriate PPE were worn at this encounter.     FINAL IMPRESSION  1. Kidney stone    2. Right lower quadrant abdominal pain    3. Nausea           DISPOSITION:  Patient will be discharged home in stable condition.    FOLLOW UP:  Jayy Paul M.D.  [address]  [place] [id number] [phone]    Schedule an appointment as soon as possible for a visit       [facility], Emergency Dept  [address]  [facility] [id number] [phone]    If symptoms worsen    Shira Hernandez M.D.  [address]  [facility] [address]  [phone]      As needed      OUTPATIENT MEDICATIONS:  New Prescriptions    ONDANSETRON (ZOFRAN ODT) 4 MG TABLET DISPERSIBLE    Take 1 Tablet by mouth every 8 hours as needed for Nausea.    OXYCODONE IMMEDIATE-RELEASE (ROXICODONE) 5 MG TAB    Take 1 Tablet by mouth every 6 hours as needed for Severe Pain for up to 3 days.       This dictation was created using voice recognition software. The accuracy of the dictation is limited to the abilities of the software. I expect there may be some errors of grammar and possibly content. The nursing notes were reviewed and certain aspects of this information were incorporated into this note.

## 2022-02-16 NOTE — ED NOTES
Pt discharged in stable condition. Pt was prescribed oxycodone, zofran by the physician. Pt instructed to follow up with urology, follow up with PCP, return to the ER if symptoms worsen. PIV removed, tip intact.

## 2022-02-16 NOTE — DISCHARGE INSTRUCTIONS
You were seen in the ED for flank pain. Your labs and imaging suggest you have a kidney stone.  Your pain improved with the medication we gave you. You can take ibuprofen, 600mg every 6 hours as needed for pain (take with food to avoid GI upset). You can also take Tylenol (acetaminophen), 1000mg every 8 hours as needed for pain (do not take more than 3000mg of acetaminophen in 24 hours). Taking these medications regularly can be very effective in controlling pain.       You are being sent home with an opioid pain medication. This medication causes sedation and you should not drive or operate machinery while taking it. You should not use alcohol or recreational drugs while taking this medication. Side effects of this medication can include itching, nausea, and constipation.    There is a risk of addiction to this medication and you should only take the smallest amount necessary to control your symptoms. You should use other non-opioid pain medications for your baseline pain and only use this medication if necessary.     There are many alternatives to pain medications, such as massage, acupuncture, and meditation. Check with your health insurance regarding their coverage of these complementary treatments.    We are not able to refill opioid or other controlled substance prescriptions in the emergency department. If you are having ongoing pain that requires opioids, please see your primary care provider or specialist for further prescriptions.       Please stay well hydrated, drink at least 3-4 liters of water daily.  Please follow-up with urology.     Please return to the ED if you develop inability to pass urine, fevers, ongoing vomiting, or new or different pain.

## 2022-03-07 ENCOUNTER — HOSPITAL ENCOUNTER (OUTPATIENT)
Facility: MEDICAL CENTER | Age: 57
End: 2022-03-07
Attending: UROLOGY
Payer: COMMERCIAL

## 2022-03-07 PROCEDURE — 87086 URINE CULTURE/COLONY COUNT: CPT

## 2022-03-10 LAB
BACTERIA UR CULT: NORMAL
SIGNIFICANT IND 70042: NORMAL
SITE SITE: NORMAL
SOURCE SOURCE: NORMAL

## 2022-03-14 ENCOUNTER — APPOINTMENT (RX ONLY)
Dept: URBAN - METROPOLITAN AREA CLINIC 22 | Facility: CLINIC | Age: 57
Setting detail: DERMATOLOGY
End: 2022-03-14

## 2022-03-14 DIAGNOSIS — D22 MELANOCYTIC NEVI: ICD-10-CM

## 2022-03-14 PROBLEM — D48.5 NEOPLASM OF UNCERTAIN BEHAVIOR OF SKIN: Status: ACTIVE | Noted: 2022-03-14

## 2022-03-14 PROBLEM — D22.61 MELANOCYTIC NEVI OF RIGHT UPPER LIMB, INCLUDING SHOULDER: Status: ACTIVE | Noted: 2022-03-14

## 2022-03-14 PROCEDURE — ? COUNSELING

## 2022-03-14 PROCEDURE — 11102 TANGNTL BX SKIN SINGLE LES: CPT

## 2022-03-14 PROCEDURE — 99202 OFFICE O/P NEW SF 15 MIN: CPT | Mod: 25

## 2022-03-14 PROCEDURE — ? BIOPSY BY SHAVE METHOD

## 2022-03-14 ASSESSMENT — LOCATION SIMPLE DESCRIPTION DERM
LOCATION SIMPLE: RIGHT ANTERIOR NECK
LOCATION SIMPLE: RIGHT SHOULDER

## 2022-03-14 ASSESSMENT — LOCATION ZONE DERM
LOCATION ZONE: NECK
LOCATION ZONE: ARM

## 2022-03-14 ASSESSMENT — LOCATION DETAILED DESCRIPTION DERM
LOCATION DETAILED: RIGHT INFERIOR LATERAL NECK
LOCATION DETAILED: RIGHT POSTERIOR SHOULDER

## 2022-04-08 ENCOUNTER — HOSPITAL ENCOUNTER (OUTPATIENT)
Dept: RADIOLOGY | Facility: MEDICAL CENTER | Age: 57
End: 2022-04-08
Attending: UROLOGY
Payer: COMMERCIAL

## 2022-04-08 DIAGNOSIS — N20.0 CALCULUS OF KIDNEY: ICD-10-CM

## 2022-04-08 PROCEDURE — 76775 US EXAM ABDO BACK WALL LIM: CPT

## 2022-04-08 PROCEDURE — 74018 RADEX ABDOMEN 1 VIEW: CPT

## 2022-06-02 ENCOUNTER — HOSPITAL ENCOUNTER (OUTPATIENT)
Facility: MEDICAL CENTER | Age: 57
End: 2022-06-02
Attending: UROLOGY | Admitting: UROLOGY
Payer: COMMERCIAL

## 2022-06-13 ENCOUNTER — APPOINTMENT (OUTPATIENT)
Dept: RADIOLOGY | Facility: MEDICAL CENTER | Age: 57
End: 2022-06-13
Attending: UROLOGY
Payer: COMMERCIAL

## 2022-06-13 ENCOUNTER — APPOINTMENT (OUTPATIENT)
Dept: RADIOLOGY | Facility: MEDICAL CENTER | Age: 57
End: 2022-06-13
Attending: EMERGENCY MEDICINE
Payer: COMMERCIAL

## 2022-06-13 ENCOUNTER — ANESTHESIA (OUTPATIENT)
Dept: SURGERY | Facility: MEDICAL CENTER | Age: 57
End: 2022-06-13
Payer: COMMERCIAL

## 2022-06-13 ENCOUNTER — ANESTHESIA EVENT (OUTPATIENT)
Dept: SURGERY | Facility: MEDICAL CENTER | Age: 57
End: 2022-06-13
Payer: COMMERCIAL

## 2022-06-13 ENCOUNTER — HOSPITAL ENCOUNTER (OUTPATIENT)
Facility: MEDICAL CENTER | Age: 57
End: 2022-06-14
Attending: EMERGENCY MEDICINE | Admitting: HOSPITALIST
Payer: COMMERCIAL

## 2022-06-13 DIAGNOSIS — N20.1 URETEROLITHIASIS: ICD-10-CM

## 2022-06-13 PROBLEM — E78.2 MIXED HYPERLIPIDEMIA: Status: ACTIVE | Noted: 2022-06-13

## 2022-06-13 PROBLEM — K21.9 GASTROESOPHAGEAL REFLUX DISEASE WITHOUT ESOPHAGITIS: Status: ACTIVE | Noted: 2022-06-13

## 2022-06-13 PROBLEM — R93.5 ABNORMAL CT OF THE ABDOMEN: Status: ACTIVE | Noted: 2022-06-13

## 2022-06-13 PROBLEM — N13.30 HYDRONEPHROSIS, RIGHT: Status: ACTIVE | Noted: 2022-06-13

## 2022-06-13 PROBLEM — R10.9 FLANK PAIN: Status: ACTIVE | Noted: 2022-06-13

## 2022-06-13 LAB
ALBUMIN SERPL BCP-MCNC: 4.8 G/DL (ref 3.2–4.9)
ALBUMIN/GLOB SERPL: 1.5 G/DL
ALP SERPL-CCNC: 110 U/L (ref 30–99)
ALT SERPL-CCNC: 16 U/L (ref 2–50)
ANION GAP SERPL CALC-SCNC: 15 MMOL/L (ref 7–16)
APPEARANCE UR: ABNORMAL
AST SERPL-CCNC: 13 U/L (ref 12–45)
BACTERIA #/AREA URNS HPF: ABNORMAL /HPF
BASOPHILS # BLD AUTO: 0.2 % (ref 0–1.8)
BASOPHILS # BLD: 0.02 K/UL (ref 0–0.12)
BILIRUB SERPL-MCNC: 0.3 MG/DL (ref 0.1–1.5)
BILIRUB UR QL STRIP.AUTO: NEGATIVE
BUN SERPL-MCNC: 15 MG/DL (ref 8–22)
CALCIUM SERPL-MCNC: 9.7 MG/DL (ref 8.4–10.2)
CAOX CRY #/AREA URNS HPF: ABNORMAL /HPF
CHLORIDE SERPL-SCNC: 104 MMOL/L (ref 96–112)
CO2 SERPL-SCNC: 20 MMOL/L (ref 20–33)
COLOR UR: YELLOW
CREAT SERPL-MCNC: 0.73 MG/DL (ref 0.5–1.4)
EOSINOPHIL # BLD AUTO: 0.11 K/UL (ref 0–0.51)
EOSINOPHIL NFR BLD: 1 % (ref 0–6.9)
EPI CELLS #/AREA URNS HPF: ABNORMAL /HPF
ERYTHROCYTE [DISTWIDTH] IN BLOOD BY AUTOMATED COUNT: 44 FL (ref 35.9–50)
GFR SERPLBLD CREATININE-BSD FMLA CKD-EPI: 97 ML/MIN/1.73 M 2
GLOBULIN SER CALC-MCNC: 3.2 G/DL (ref 1.9–3.5)
GLUCOSE BLD STRIP.AUTO-MCNC: 73 MG/DL (ref 65–99)
GLUCOSE SERPL-MCNC: 108 MG/DL (ref 65–99)
GLUCOSE UR STRIP.AUTO-MCNC: NEGATIVE MG/DL
HCT VFR BLD AUTO: 38.5 % (ref 37–47)
HGB BLD-MCNC: 12.3 G/DL (ref 12–16)
IMM GRANULOCYTES # BLD AUTO: 0.04 K/UL (ref 0–0.11)
IMM GRANULOCYTES NFR BLD AUTO: 0.4 % (ref 0–0.9)
KETONES UR STRIP.AUTO-MCNC: 15 MG/DL
LEUKOCYTE ESTERASE UR QL STRIP.AUTO: NEGATIVE
LIPASE SERPL-CCNC: 24 U/L (ref 7–58)
LYMPHOCYTES # BLD AUTO: 1.61 K/UL (ref 1–4.8)
LYMPHOCYTES NFR BLD: 14.2 % (ref 22–41)
MCH RBC QN AUTO: 26.1 PG (ref 27–33)
MCHC RBC AUTO-ENTMCNC: 31.9 G/DL (ref 33.6–35)
MCV RBC AUTO: 81.6 FL (ref 81.4–97.8)
MICRO URNS: ABNORMAL
MONOCYTES # BLD AUTO: 0.69 K/UL (ref 0–0.85)
MONOCYTES NFR BLD AUTO: 6.1 % (ref 0–13.4)
MUCOUS THREADS #/AREA URNS HPF: ABNORMAL /HPF
NEUTROPHILS # BLD AUTO: 8.88 K/UL (ref 2–7.15)
NEUTROPHILS NFR BLD: 78.1 % (ref 44–72)
NITRITE UR QL STRIP.AUTO: NEGATIVE
NRBC # BLD AUTO: 0 K/UL
NRBC BLD-RTO: 0 /100 WBC
PH UR STRIP.AUTO: 6 [PH] (ref 5–8)
PLATELET # BLD AUTO: 455 K/UL (ref 164–446)
PMV BLD AUTO: 10 FL (ref 9–12.9)
POTASSIUM SERPL-SCNC: 4.3 MMOL/L (ref 3.6–5.5)
PROT SERPL-MCNC: 8 G/DL (ref 6–8.2)
PROT UR QL STRIP: NEGATIVE MG/DL
RBC # BLD AUTO: 4.72 M/UL (ref 4.2–5.4)
RBC # URNS HPF: ABNORMAL /HPF
RBC UR QL AUTO: ABNORMAL
SODIUM SERPL-SCNC: 139 MMOL/L (ref 135–145)
SP GR UR STRIP.AUTO: >=1.03
WBC # BLD AUTO: 11.4 K/UL (ref 4.8–10.8)
WBC #/AREA URNS HPF: ABNORMAL /HPF

## 2022-06-13 PROCEDURE — 83690 ASSAY OF LIPASE: CPT

## 2022-06-13 PROCEDURE — 700111 HCHG RX REV CODE 636 W/ 250 OVERRIDE (IP): Performed by: EMERGENCY MEDICINE

## 2022-06-13 PROCEDURE — 96375 TX/PRO/DX INJ NEW DRUG ADDON: CPT

## 2022-06-13 PROCEDURE — G0378 HOSPITAL OBSERVATION PER HR: HCPCS

## 2022-06-13 PROCEDURE — 85025 COMPLETE CBC W/AUTO DIFF WBC: CPT

## 2022-06-13 PROCEDURE — 700105 HCHG RX REV CODE 258: Performed by: EMERGENCY MEDICINE

## 2022-06-13 PROCEDURE — 82962 GLUCOSE BLOOD TEST: CPT

## 2022-06-13 PROCEDURE — 700101 HCHG RX REV CODE 250: Performed by: ANESTHESIOLOGY

## 2022-06-13 PROCEDURE — 700105 HCHG RX REV CODE 258: Performed by: UROLOGY

## 2022-06-13 PROCEDURE — 160009 HCHG ANES TIME/MIN: Performed by: UROLOGY

## 2022-06-13 PROCEDURE — 99220 PR INITIAL OBSERVATION CARE,LEVL III: CPT | Performed by: HOSPITALIST

## 2022-06-13 PROCEDURE — 80053 COMPREHEN METABOLIC PANEL: CPT

## 2022-06-13 PROCEDURE — 74176 CT ABD & PELVIS W/O CONTRAST: CPT

## 2022-06-13 PROCEDURE — 160028 HCHG SURGERY MINUTES - 1ST 30 MINS LEVEL 3: Performed by: UROLOGY

## 2022-06-13 PROCEDURE — 160048 HCHG OR STATISTICAL LEVEL 1-5: Performed by: UROLOGY

## 2022-06-13 PROCEDURE — 81001 URINALYSIS AUTO W/SCOPE: CPT

## 2022-06-13 PROCEDURE — C1758 CATHETER, URETERAL: HCPCS | Performed by: UROLOGY

## 2022-06-13 PROCEDURE — 160035 HCHG PACU - 1ST 60 MINS PHASE I: Performed by: UROLOGY

## 2022-06-13 PROCEDURE — C1769 GUIDE WIRE: HCPCS | Performed by: UROLOGY

## 2022-06-13 PROCEDURE — 00918 ANES TRURL PX URTRL CAL RMVL: CPT | Performed by: ANESTHESIOLOGY

## 2022-06-13 PROCEDURE — 700111 HCHG RX REV CODE 636 W/ 250 OVERRIDE (IP): Performed by: ANESTHESIOLOGY

## 2022-06-13 PROCEDURE — 99291 CRITICAL CARE FIRST HOUR: CPT

## 2022-06-13 PROCEDURE — 96374 THER/PROPH/DIAG INJ IV PUSH: CPT

## 2022-06-13 PROCEDURE — 160002 HCHG RECOVERY MINUTES (STAT): Performed by: UROLOGY

## 2022-06-13 PROCEDURE — 36415 COLL VENOUS BLD VENIPUNCTURE: CPT

## 2022-06-13 RX ORDER — FLUTICASONE PROPIONATE 110 UG/1
1 AEROSOL, METERED RESPIRATORY (INHALATION)
Status: DISCONTINUED | OUTPATIENT
Start: 2022-06-14 | End: 2022-06-14 | Stop reason: HOSPADM

## 2022-06-13 RX ORDER — ALBUTEROL SULFATE 90 UG/1
2 AEROSOL, METERED RESPIRATORY (INHALATION)
Status: DISCONTINUED | OUTPATIENT
Start: 2022-06-13 | End: 2022-06-14 | Stop reason: HOSPADM

## 2022-06-13 RX ORDER — HYDROMORPHONE HYDROCHLORIDE 1 MG/ML
0.4 INJECTION, SOLUTION INTRAMUSCULAR; INTRAVENOUS; SUBCUTANEOUS
Status: DISCONTINUED | OUTPATIENT
Start: 2022-06-13 | End: 2022-06-13 | Stop reason: HOSPADM

## 2022-06-13 RX ORDER — MIDAZOLAM HYDROCHLORIDE 1 MG/ML
INJECTION INTRAMUSCULAR; INTRAVENOUS PRN
Status: DISCONTINUED | OUTPATIENT
Start: 2022-06-13 | End: 2022-06-13 | Stop reason: SURG

## 2022-06-13 RX ORDER — DULOXETIN HYDROCHLORIDE 30 MG/1
30 CAPSULE, DELAYED RELEASE ORAL DAILY
Status: DISCONTINUED | OUTPATIENT
Start: 2022-06-14 | End: 2022-06-14 | Stop reason: HOSPADM

## 2022-06-13 RX ORDER — SODIUM CHLORIDE 9 MG/ML
INJECTION, SOLUTION INTRAVENOUS CONTINUOUS
Status: DISCONTINUED | OUTPATIENT
Start: 2022-06-13 | End: 2022-06-14 | Stop reason: HOSPADM

## 2022-06-13 RX ORDER — PROMETHAZINE HYDROCHLORIDE 25 MG/1
12.5-25 TABLET ORAL EVERY 4 HOURS PRN
Status: DISCONTINUED | OUTPATIENT
Start: 2022-06-13 | End: 2022-06-14 | Stop reason: HOSPADM

## 2022-06-13 RX ORDER — METOPROLOL TARTRATE 1 MG/ML
1 INJECTION, SOLUTION INTRAVENOUS
Status: DISCONTINUED | OUTPATIENT
Start: 2022-06-13 | End: 2022-06-13 | Stop reason: HOSPADM

## 2022-06-13 RX ORDER — ACETAMINOPHEN 325 MG/1
650 TABLET ORAL EVERY 6 HOURS PRN
Status: DISCONTINUED | OUTPATIENT
Start: 2022-06-13 | End: 2022-06-14 | Stop reason: HOSPADM

## 2022-06-13 RX ORDER — BISACODYL 10 MG
10 SUPPOSITORY, RECTAL RECTAL
Status: DISCONTINUED | OUTPATIENT
Start: 2022-06-13 | End: 2022-06-14 | Stop reason: HOSPADM

## 2022-06-13 RX ORDER — IPRATROPIUM BROMIDE AND ALBUTEROL SULFATE 2.5; .5 MG/3ML; MG/3ML
3 SOLUTION RESPIRATORY (INHALATION)
Status: DISCONTINUED | OUTPATIENT
Start: 2022-06-13 | End: 2022-06-13 | Stop reason: HOSPADM

## 2022-06-13 RX ORDER — OMEPRAZOLE 20 MG/1
40 CAPSULE, DELAYED RELEASE ORAL DAILY
Status: DISCONTINUED | OUTPATIENT
Start: 2022-06-14 | End: 2022-06-14 | Stop reason: HOSPADM

## 2022-06-13 RX ORDER — ONDANSETRON 2 MG/ML
INJECTION INTRAMUSCULAR; INTRAVENOUS PRN
Status: DISCONTINUED | OUTPATIENT
Start: 2022-06-13 | End: 2022-06-13 | Stop reason: SURG

## 2022-06-13 RX ORDER — SODIUM CHLORIDE, SODIUM LACTATE, POTASSIUM CHLORIDE, CALCIUM CHLORIDE 600; 310; 30; 20 MG/100ML; MG/100ML; MG/100ML; MG/100ML
INJECTION, SOLUTION INTRAVENOUS CONTINUOUS
Status: DISCONTINUED | OUTPATIENT
Start: 2022-06-13 | End: 2022-06-13

## 2022-06-13 RX ORDER — DIPHENHYDRAMINE HYDROCHLORIDE 50 MG/ML
12.5 INJECTION INTRAMUSCULAR; INTRAVENOUS
Status: DISCONTINUED | OUTPATIENT
Start: 2022-06-13 | End: 2022-06-13 | Stop reason: HOSPADM

## 2022-06-13 RX ORDER — POLYETHYLENE GLYCOL 3350 17 G/17G
1 POWDER, FOR SOLUTION ORAL
Status: DISCONTINUED | OUTPATIENT
Start: 2022-06-13 | End: 2022-06-14 | Stop reason: HOSPADM

## 2022-06-13 RX ORDER — OXYCODONE HYDROCHLORIDE AND ACETAMINOPHEN 5; 325 MG/1; MG/1
1 TABLET ORAL
Status: DISCONTINUED | OUTPATIENT
Start: 2022-06-13 | End: 2022-06-13 | Stop reason: HOSPADM

## 2022-06-13 RX ORDER — HALOPERIDOL 5 MG/ML
1 INJECTION INTRAMUSCULAR
Status: DISCONTINUED | OUTPATIENT
Start: 2022-06-13 | End: 2022-06-13 | Stop reason: HOSPADM

## 2022-06-13 RX ORDER — PROCHLORPERAZINE EDISYLATE 5 MG/ML
5-10 INJECTION INTRAMUSCULAR; INTRAVENOUS EVERY 4 HOURS PRN
Status: DISCONTINUED | OUTPATIENT
Start: 2022-06-13 | End: 2022-06-14 | Stop reason: HOSPADM

## 2022-06-13 RX ORDER — SODIUM CHLORIDE, SODIUM LACTATE, POTASSIUM CHLORIDE, CALCIUM CHLORIDE 600; 310; 30; 20 MG/100ML; MG/100ML; MG/100ML; MG/100ML
INJECTION, SOLUTION INTRAVENOUS CONTINUOUS
Status: DISCONTINUED | OUTPATIENT
Start: 2022-06-13 | End: 2022-06-14 | Stop reason: HOSPADM

## 2022-06-13 RX ORDER — OXYCODONE HYDROCHLORIDE AND ACETAMINOPHEN 5; 325 MG/1; MG/1
2 TABLET ORAL
Status: DISCONTINUED | OUTPATIENT
Start: 2022-06-13 | End: 2022-06-13 | Stop reason: HOSPADM

## 2022-06-13 RX ORDER — KETOROLAC TROMETHAMINE 30 MG/ML
15 INJECTION, SOLUTION INTRAMUSCULAR; INTRAVENOUS ONCE
Status: COMPLETED | OUTPATIENT
Start: 2022-06-13 | End: 2022-06-13

## 2022-06-13 RX ORDER — OXYCODONE HYDROCHLORIDE 5 MG/1
2.5 TABLET ORAL
Status: DISCONTINUED | OUTPATIENT
Start: 2022-06-13 | End: 2022-06-14 | Stop reason: HOSPADM

## 2022-06-13 RX ORDER — OXYCODONE HYDROCHLORIDE 5 MG/1
5 TABLET ORAL
Status: DISCONTINUED | OUTPATIENT
Start: 2022-06-13 | End: 2022-06-14 | Stop reason: HOSPADM

## 2022-06-13 RX ORDER — HYDROMORPHONE HYDROCHLORIDE 1 MG/ML
0.2 INJECTION, SOLUTION INTRAMUSCULAR; INTRAVENOUS; SUBCUTANEOUS
Status: DISCONTINUED | OUTPATIENT
Start: 2022-06-13 | End: 2022-06-13 | Stop reason: HOSPADM

## 2022-06-13 RX ORDER — PROMETHAZINE HYDROCHLORIDE 25 MG/1
12.5-25 SUPPOSITORY RECTAL EVERY 4 HOURS PRN
Status: DISCONTINUED | OUTPATIENT
Start: 2022-06-13 | End: 2022-06-14 | Stop reason: HOSPADM

## 2022-06-13 RX ORDER — DEXTROSE MONOHYDRATE 25 G/50ML
12.5 INJECTION, SOLUTION INTRAVENOUS
Status: DISCONTINUED | OUTPATIENT
Start: 2022-06-13 | End: 2022-06-13 | Stop reason: HOSPADM

## 2022-06-13 RX ORDER — ATORVASTATIN CALCIUM 10 MG/1
10 TABLET, FILM COATED ORAL
Status: DISCONTINUED | OUTPATIENT
Start: 2022-06-13 | End: 2022-06-14 | Stop reason: HOSPADM

## 2022-06-13 RX ORDER — ROCURONIUM BROMIDE 10 MG/ML
INJECTION, SOLUTION INTRAVENOUS PRN
Status: DISCONTINUED | OUTPATIENT
Start: 2022-06-13 | End: 2022-06-13 | Stop reason: SURG

## 2022-06-13 RX ORDER — AMOXICILLIN 250 MG
2 CAPSULE ORAL 2 TIMES DAILY
Status: DISCONTINUED | OUTPATIENT
Start: 2022-06-14 | End: 2022-06-14 | Stop reason: HOSPADM

## 2022-06-13 RX ORDER — ONDANSETRON 2 MG/ML
4 INJECTION INTRAMUSCULAR; INTRAVENOUS
Status: DISCONTINUED | OUTPATIENT
Start: 2022-06-13 | End: 2022-06-13 | Stop reason: HOSPADM

## 2022-06-13 RX ORDER — MEPERIDINE HYDROCHLORIDE 25 MG/ML
12.5 INJECTION INTRAMUSCULAR; INTRAVENOUS; SUBCUTANEOUS
Status: DISCONTINUED | OUTPATIENT
Start: 2022-06-13 | End: 2022-06-13 | Stop reason: HOSPADM

## 2022-06-13 RX ORDER — ONDANSETRON 2 MG/ML
4 INJECTION INTRAMUSCULAR; INTRAVENOUS EVERY 4 HOURS PRN
Status: DISCONTINUED | OUTPATIENT
Start: 2022-06-13 | End: 2022-06-14 | Stop reason: HOSPADM

## 2022-06-13 RX ORDER — HYDROMORPHONE HYDROCHLORIDE 1 MG/ML
0.1 INJECTION, SOLUTION INTRAMUSCULAR; INTRAVENOUS; SUBCUTANEOUS
Status: DISCONTINUED | OUTPATIENT
Start: 2022-06-13 | End: 2022-06-13 | Stop reason: HOSPADM

## 2022-06-13 RX ORDER — DEXAMETHASONE SODIUM PHOSPHATE 4 MG/ML
INJECTION, SOLUTION INTRA-ARTICULAR; INTRALESIONAL; INTRAMUSCULAR; INTRAVENOUS; SOFT TISSUE PRN
Status: DISCONTINUED | OUTPATIENT
Start: 2022-06-13 | End: 2022-06-13 | Stop reason: SURG

## 2022-06-13 RX ORDER — SODIUM CHLORIDE, SODIUM LACTATE, POTASSIUM CHLORIDE, CALCIUM CHLORIDE 600; 310; 30; 20 MG/100ML; MG/100ML; MG/100ML; MG/100ML
INJECTION, SOLUTION INTRAVENOUS CONTINUOUS
Status: DISCONTINUED | OUTPATIENT
Start: 2022-06-13 | End: 2022-06-13 | Stop reason: HOSPADM

## 2022-06-13 RX ORDER — BUPROPION HYDROCHLORIDE 150 MG/1
450 TABLET, EXTENDED RELEASE ORAL EVERY MORNING
Status: DISCONTINUED | OUTPATIENT
Start: 2022-06-14 | End: 2022-06-14 | Stop reason: HOSPADM

## 2022-06-13 RX ORDER — HYDROMORPHONE HYDROCHLORIDE 1 MG/ML
0.25 INJECTION, SOLUTION INTRAMUSCULAR; INTRAVENOUS; SUBCUTANEOUS
Status: DISCONTINUED | OUTPATIENT
Start: 2022-06-13 | End: 2022-06-14 | Stop reason: HOSPADM

## 2022-06-13 RX ORDER — BUPROPION HYDROCHLORIDE 300 MG/1
300 TABLET ORAL EVERY MORNING
Status: DISCONTINUED | OUTPATIENT
Start: 2022-06-14 | End: 2022-06-13

## 2022-06-13 RX ORDER — CEFAZOLIN SODIUM 1 G/3ML
INJECTION, POWDER, FOR SOLUTION INTRAMUSCULAR; INTRAVENOUS PRN
Status: DISCONTINUED | OUTPATIENT
Start: 2022-06-13 | End: 2022-06-13 | Stop reason: SURG

## 2022-06-13 RX ORDER — ONDANSETRON 2 MG/ML
4 INJECTION INTRAMUSCULAR; INTRAVENOUS ONCE
Status: COMPLETED | OUTPATIENT
Start: 2022-06-13 | End: 2022-06-13

## 2022-06-13 RX ORDER — ONDANSETRON 4 MG/1
4 TABLET, ORALLY DISINTEGRATING ORAL EVERY 4 HOURS PRN
Status: DISCONTINUED | OUTPATIENT
Start: 2022-06-13 | End: 2022-06-14 | Stop reason: HOSPADM

## 2022-06-13 RX ORDER — THYROID 30 MG/1
60 TABLET ORAL DAILY
Status: DISCONTINUED | OUTPATIENT
Start: 2022-06-14 | End: 2022-06-14 | Stop reason: HOSPADM

## 2022-06-13 RX ORDER — LIDOCAINE HYDROCHLORIDE 20 MG/ML
INJECTION, SOLUTION EPIDURAL; INFILTRATION; INTRACAUDAL; PERINEURAL PRN
Status: DISCONTINUED | OUTPATIENT
Start: 2022-06-13 | End: 2022-06-13 | Stop reason: SURG

## 2022-06-13 RX ORDER — HYDRALAZINE HYDROCHLORIDE 20 MG/ML
5 INJECTION INTRAMUSCULAR; INTRAVENOUS
Status: DISCONTINUED | OUTPATIENT
Start: 2022-06-13 | End: 2022-06-13 | Stop reason: HOSPADM

## 2022-06-13 RX ADMIN — SUGAMMADEX 200 MG: 100 INJECTION, SOLUTION INTRAVENOUS at 22:07

## 2022-06-13 RX ADMIN — FENTANYL CITRATE 50 MCG: 50 INJECTION, SOLUTION INTRAMUSCULAR; INTRAVENOUS at 21:49

## 2022-06-13 RX ADMIN — LIDOCAINE HYDROCHLORIDE 100 MG: 20 INJECTION, SOLUTION EPIDURAL; INFILTRATION; INTRACAUDAL at 21:51

## 2022-06-13 RX ADMIN — PROPOFOL 30 MG: 10 INJECTION, EMULSION INTRAVENOUS at 22:07

## 2022-06-13 RX ADMIN — SODIUM CHLORIDE: 9 INJECTION, SOLUTION INTRAVENOUS at 23:45

## 2022-06-13 RX ADMIN — DEXAMETHASONE SODIUM PHOSPHATE 4 MG: 4 INJECTION, SOLUTION INTRA-ARTICULAR; INTRALESIONAL; INTRAMUSCULAR; INTRAVENOUS; SOFT TISSUE at 21:51

## 2022-06-13 RX ADMIN — SODIUM CHLORIDE, POTASSIUM CHLORIDE, SODIUM LACTATE AND CALCIUM CHLORIDE: 600; 310; 30; 20 INJECTION, SOLUTION INTRAVENOUS at 21:48

## 2022-06-13 RX ADMIN — FENTANYL CITRATE 50 MCG: 50 INJECTION, SOLUTION INTRAMUSCULAR; INTRAVENOUS at 21:57

## 2022-06-13 RX ADMIN — MIDAZOLAM HYDROCHLORIDE 2 MG: 1 INJECTION, SOLUTION INTRAMUSCULAR; INTRAVENOUS at 21:48

## 2022-06-13 RX ADMIN — CEFAZOLIN 2 G: 330 INJECTION, POWDER, FOR SOLUTION INTRAMUSCULAR; INTRAVENOUS at 21:51

## 2022-06-13 RX ADMIN — ONDANSETRON HYDROCHLORIDE 4 MG: 2 SOLUTION INTRAMUSCULAR; INTRAVENOUS at 20:45

## 2022-06-13 RX ADMIN — ONDANSETRON 4 MG: 2 INJECTION INTRAMUSCULAR; INTRAVENOUS at 22:07

## 2022-06-13 RX ADMIN — FENTANYL CITRATE 50 MCG: 50 INJECTION, SOLUTION INTRAMUSCULAR; INTRAVENOUS at 20:48

## 2022-06-13 RX ADMIN — KETOROLAC TROMETHAMINE 15 MG: 30 INJECTION, SOLUTION INTRAMUSCULAR at 19:37

## 2022-06-13 RX ADMIN — PROPOFOL 150 MG: 10 INJECTION, EMULSION INTRAVENOUS at 21:51

## 2022-06-13 RX ADMIN — ROCURONIUM BROMIDE 50 MG: 10 INJECTION, SOLUTION INTRAVENOUS at 21:51

## 2022-06-13 ASSESSMENT — FIBROSIS 4 INDEX
FIB4 SCORE: 0.46
FIB4 SCORE: 0.39

## 2022-06-13 ASSESSMENT — COGNITIVE AND FUNCTIONAL STATUS - GENERAL
DAILY ACTIVITIY SCORE: 24
SUGGESTED CMS G CODE MODIFIER DAILY ACTIVITY: CH
SUGGESTED CMS G CODE MODIFIER MOBILITY: CH
MOBILITY SCORE: 24

## 2022-06-13 ASSESSMENT — ENCOUNTER SYMPTOMS
SHORTNESS OF BREATH: 0
CHILLS: 1
NERVOUS/ANXIOUS: 0
EYE REDNESS: 0
STRIDOR: 0
FLANK PAIN: 1
VOMITING: 0
FOCAL WEAKNESS: 0
FEVER: 0
COUGH: 0
MYALGIAS: 0
ABDOMINAL PAIN: 0
EYE DISCHARGE: 0
BRUISES/BLEEDS EASILY: 0

## 2022-06-13 ASSESSMENT — LIFESTYLE VARIABLES
TOTAL SCORE: 0
TOTAL SCORE: 0
HOW MANY TIMES IN THE PAST YEAR HAVE YOU HAD 5 OR MORE DRINKS IN A DAY: 0
HAVE PEOPLE ANNOYED YOU BY CRITICIZING YOUR DRINKING: NO
EVER HAD A DRINK FIRST THING IN THE MORNING TO STEADY YOUR NERVES TO GET RID OF A HANGOVER: NO
CONSUMPTION TOTAL: NEGATIVE
ALCOHOL_USE: NO
TOTAL SCORE: 0
AVERAGE NUMBER OF DAYS PER WEEK YOU HAVE A DRINK CONTAINING ALCOHOL: 0
ON A TYPICAL DAY WHEN YOU DRINK ALCOHOL HOW MANY DRINKS DO YOU HAVE: 0
HAVE YOU EVER FELT YOU SHOULD CUT DOWN ON YOUR DRINKING: NO
EVER FELT BAD OR GUILTY ABOUT YOUR DRINKING: NO

## 2022-06-13 ASSESSMENT — PAIN DESCRIPTION - PAIN TYPE
TYPE: ACUTE PAIN
TYPE: ACUTE PAIN

## 2022-06-13 ASSESSMENT — PATIENT HEALTH QUESTIONNAIRE - PHQ9
SUM OF ALL RESPONSES TO PHQ9 QUESTIONS 1 AND 2: 0
1. LITTLE INTEREST OR PLEASURE IN DOING THINGS: NOT AT ALL
2. FEELING DOWN, DEPRESSED, IRRITABLE, OR HOPELESS: NOT AT ALL

## 2022-06-13 NOTE — LETTER
2022    To Whom It May Concern:         This is confirmation that Keshia Eaton,  1965, was treated at [facility] for medical illness. She may return to work after 2022.          If you have any questions please do not hesitate to call me at the phone number listed below.    Sincerely,          Jovanni Estrada M.D.  [phone]

## 2022-06-14 VITALS
SYSTOLIC BLOOD PRESSURE: 129 MMHG | OXYGEN SATURATION: 92 % | TEMPERATURE: 98.1 F | HEART RATE: 84 BPM | HEIGHT: 62 IN | RESPIRATION RATE: 18 BRPM | DIASTOLIC BLOOD PRESSURE: 71 MMHG | BODY MASS INDEX: 29.94 KG/M2 | WEIGHT: 162.7 LBS

## 2022-06-14 LAB
25(OH)D3 SERPL-MCNC: 34 NG/ML (ref 30–100)
ALBUMIN SERPL BCP-MCNC: 4.4 G/DL (ref 3.2–4.9)
ALBUMIN/GLOB SERPL: 1.4 G/DL
ALP SERPL-CCNC: 102 U/L (ref 30–99)
ALT SERPL-CCNC: 15 U/L (ref 2–50)
ANION GAP SERPL CALC-SCNC: 14 MMOL/L (ref 7–16)
AST SERPL-CCNC: 13 U/L (ref 12–45)
BILIRUB SERPL-MCNC: 0.3 MG/DL (ref 0.1–1.5)
BUN SERPL-MCNC: 13 MG/DL (ref 8–22)
CA-I SERPL-SCNC: 1.17 MMOL/L (ref 1.1–1.3)
CALCIUM SERPL-MCNC: 9.5 MG/DL (ref 8.4–10.2)
CHLORIDE SERPL-SCNC: 104 MMOL/L (ref 96–112)
CO2 SERPL-SCNC: 21 MMOL/L (ref 20–33)
CREAT SERPL-MCNC: 0.74 MG/DL (ref 0.5–1.4)
ERYTHROCYTE [DISTWIDTH] IN BLOOD BY AUTOMATED COUNT: 44.2 FL (ref 35.9–50)
GFR SERPLBLD CREATININE-BSD FMLA CKD-EPI: 96 ML/MIN/1.73 M 2
GLOBULIN SER CALC-MCNC: 3.2 G/DL (ref 1.9–3.5)
GLUCOSE BLD STRIP.AUTO-MCNC: 127 MG/DL (ref 65–99)
GLUCOSE BLD STRIP.AUTO-MCNC: 132 MG/DL (ref 65–99)
GLUCOSE SERPL-MCNC: 154 MG/DL (ref 65–99)
HCT VFR BLD AUTO: 36.1 % (ref 37–47)
HGB BLD-MCNC: 11.7 G/DL (ref 12–16)
MAGNESIUM SERPL-MCNC: 2 MG/DL (ref 1.5–2.5)
MCH RBC QN AUTO: 26.5 PG (ref 27–33)
MCHC RBC AUTO-ENTMCNC: 32.4 G/DL (ref 33.6–35)
MCV RBC AUTO: 81.7 FL (ref 81.4–97.8)
PLATELET # BLD AUTO: 423 K/UL (ref 164–446)
PMV BLD AUTO: 9.9 FL (ref 9–12.9)
POTASSIUM SERPL-SCNC: 4.4 MMOL/L (ref 3.6–5.5)
PROT SERPL-MCNC: 7.6 G/DL (ref 6–8.2)
PTH-INTACT SERPL-MCNC: 38.6 PG/ML (ref 14–72)
RBC # BLD AUTO: 4.42 M/UL (ref 4.2–5.4)
SODIUM SERPL-SCNC: 139 MMOL/L (ref 135–145)
WBC # BLD AUTO: 10 K/UL (ref 4.8–10.8)

## 2022-06-14 PROCEDURE — G0378 HOSPITAL OBSERVATION PER HR: HCPCS

## 2022-06-14 PROCEDURE — 700105 HCHG RX REV CODE 258: Performed by: EMERGENCY MEDICINE

## 2022-06-14 PROCEDURE — 82306 VITAMIN D 25 HYDROXY: CPT

## 2022-06-14 PROCEDURE — 36415 COLL VENOUS BLD VENIPUNCTURE: CPT

## 2022-06-14 PROCEDURE — 83970 ASSAY OF PARATHORMONE: CPT

## 2022-06-14 PROCEDURE — 85027 COMPLETE CBC AUTOMATED: CPT

## 2022-06-14 PROCEDURE — 94760 N-INVAS EAR/PLS OXIMETRY 1: CPT

## 2022-06-14 PROCEDURE — 83735 ASSAY OF MAGNESIUM: CPT

## 2022-06-14 PROCEDURE — 82330 ASSAY OF CALCIUM: CPT

## 2022-06-14 PROCEDURE — 99217 PR OBSERVATION CARE DISCHARGE: CPT | Performed by: HOSPITALIST

## 2022-06-14 PROCEDURE — 82962 GLUCOSE BLOOD TEST: CPT

## 2022-06-14 PROCEDURE — 80053 COMPREHEN METABOLIC PANEL: CPT

## 2022-06-14 RX ADMIN — SODIUM CHLORIDE: 9 INJECTION, SOLUTION INTRAVENOUS at 05:54

## 2022-06-14 ASSESSMENT — PAIN SCALES - GENERAL: PAIN_LEVEL: 0

## 2022-06-14 ASSESSMENT — ENCOUNTER SYMPTOMS
CHILLS: 0
ABDOMINAL PAIN: 0
NAUSEA: 0
FLANK PAIN: 0
VOMITING: 0
FEVER: 0

## 2022-06-14 ASSESSMENT — PAIN DESCRIPTION - PAIN TYPE: TYPE: ACUTE PAIN

## 2022-06-14 NOTE — DISCHARGE SUMMARY
"Discharge Summary    CHIEF COMPLAINT ON ADMISSION  Chief Complaint   Patient presents with   • Flank Pain     Reports R flank pain, dx with kidney stones and states \"I'm supposed to have surgery on 6/28 to break up the stones, I have a bunch of reflux up into my kidneys. Today the pain is just really bad\". Denies known fevers. Reports she was not given any medications for this pain.        Reason for Admission  Flank Pain      Admission Date  6/13/2022    CODE STATUS  Full Code    HPI & HOSPITAL COURSE  Ms. Keshia Flowers is a 56-year-old female comes into the hospital complaining of right flank pain.  The patient was diagnosed immediately with a kidney stone.  The kidney stone was 8 mm in size.  Urology was thus consulted.  The patient went to the operating room for cystoscopy. The patient underwent rigid cystoscopy urethroscopy with laser lithotripsy of a 8 mm ureteral stone that was most likely calcium oxalate.  The stone was pulverized.  Postoperatively the patient did well.  This morning the patient has resumed a normal diet and is at this point having normal urination bowel movements without any nausea vomiting patient is able to mobilize in the room independently.  Patient this point will be able to discharge home with outpatient follow-ups with her primary care physician as well as urology as an outpatient.  Patient is alert awake oriented x3 pleasant cooperative female understands her discharge plan instructions.  Patient currently has no pain.    Therefore, she is discharged in good and stable condition to home with close outpatient follow-up.      Discharge Date  6/14/2022    FOLLOW UP ITEMS POST DISCHARGE  Follow-up with the primary care physician in 2-3 days    DISCHARGE DIAGNOSES  Principal Problem:    Ureteral stone POA: Yes  Active Problems:    Mild intermittent asthma without complication POA: Yes    Uncontrolled type 2 diabetes mellitus with hyperglycemia (HCC) POA: Yes    Gastroesophageal reflux " disease without esophagitis POA: Yes    Mixed hyperlipidemia POA: Yes    Hydronephrosis, right POA: Yes    Flank pain POA: Yes    Abnormal CT of the abdomen POA: Unknown  Resolved Problems:    * No resolved hospital problems. *      FOLLOW UP  Future Appointments   Date Time Provider Department Center   6/17/2022 11:15 AM [facility] None   6/25/2022  9:30 AM [facility] None     No follow-up provider specified.    MEDICATIONS ON DISCHARGE     Medication List      CONTINUE taking these medications      Instructions   albuterol 108 (90 Base) MCG/ACT Aers inhalation aerosol   Inhale 2 Puffs by mouth every 6 hours as needed for Shortness of Breath.  Dose: 2 Puff     atorvastatin 10 MG Tabs  Commonly known as: LIPITOR   Take 10 mg by mouth every day.  Dose: 10 mg     * buPROPion 300 MG XL tablet  Commonly known as: WELLBUTRIN XL   Take 300 mg by mouth every morning. Take 300 mg tab with 150 mg tab for a total dose of 450 mg daily  Dose: 300 mg     * buPROPion 150 MG XL tablet  Commonly known as: WELLBUTRIN XL   Take 150 mg by mouth every morning. Take 150 mg tab with 300 mg tab for a total dose of 450 mg daily  Dose: 150 mg     DULoxetine 30 MG Cpep  Commonly known as: CYMBALTA   Take 30 mg by mouth every day.  Dose: 30 mg     metFORMIN  MG Tb24  Commonly known as: GLUCOPHAGE XR   Take 1,000 mg by mouth 2 times a day.  Dose: 1,000 mg     mometasone 220 MCG/INH inhaler  Commonly known as: ASMANEX   Inhale 1 Puff by mouth every day.  Dose: 1 Puff     omeprazole 40 MG delayed-release capsule  Commonly known as: PRILOSEC   Take 40 mg by mouth every day.  Dose: 40 mg     ondansetron 4 MG Tbdp  Commonly known as: ZOFRAN ODT   Take 1 Tablet by mouth every 8 hours as needed for Nausea.  Dose: 4 mg     Semaglutide(0.25 or 0.5MG/DOS) 2 MG/1.5ML Sopn   Inject 0.5 mg under the skin every Saturday.  Dose: 0.5 mg     thyroid 60 MG Tabs  Commonly known as: ARMOUR THYROID   Take 60 mg by mouth every day.  Dose: 60 mg      Vitamin D 50 MCG (2000 UT) Caps   Take 2,000 Units by mouth every day.  Dose: 2,000 Units         * This list has 2 medication(s) that are the same as other medications prescribed for you. Read the directions carefully, and ask your doctor or other care provider to review them with you.                Allergies  Allergies   Allergen Reactions   • Nkda [No Known Drug Allergy]        DIET  Orders Placed This Encounter   Procedures   • Diet Order Diet: Regular     Standing Status:   Standing     Number of Occurrences:   1     Order Specific Question:   Diet:     Answer:   Regular [1]       ACTIVITY  As tolerated.  Weight bearing as tolerated    CONSULTATIONS  Urology Dr. Conde    PROCEDURES  Rigid cystourethroscopy with holmium laser lithotripsy done on 6/13/2022    LABORATORY  Lab Results   Component Value Date    SODIUM 139 06/14/2022    POTASSIUM 4.4 06/14/2022    CHLORIDE 104 06/14/2022    CO2 21 06/14/2022    GLUCOSE 154 (H) 06/14/2022    BUN 13 06/14/2022    CREATININE 0.74 06/14/2022    CREATININE 1.0 04/08/2008        Lab Results   Component Value Date    WBC 10.0 06/14/2022    HEMOGLOBIN 11.7 (L) 06/14/2022    HEMATOCRIT 36.1 (L) 06/14/2022    PLATELETCT 423 06/14/2022        Total time of the discharge process exceeds 35 minutes.

## 2022-06-14 NOTE — PROGRESS NOTES
Pt arrived on unit. VSS. Pt c/o 1/10 R flank pain. No n/v. RN discussed POC with pt. All questions answered. Pt is requesting to rest. Fall precautions in place.

## 2022-06-14 NOTE — ASSESSMENT & PLAN NOTE
Imaging shows worsening severe right hydroureteronephrosis with slight distal migration of the 8 mm    Urology, Dr. Conde consulted  Plan for lithotripsy  IVF   Multimodal pain control

## 2022-06-14 NOTE — OR SURGEON
Immediate Post OP Note    PreOp Diagnosis: Right flank pain                                Right ureteral stone      PostOp Diagnosis: As above      Procedure(s):  CYSTOSCOPY - Wound Class: Clean Contaminated  RIGHT URETEROSCOPY - Wound Class: Clean Contaminated  LITHOTRIPSY, USING LASER - Wound Class: Clean Contaminated    Surgeon(s):  Rigo Conde M.D.    Anesthesiologist/Type of Anesthesia:  Anesthesiologist: Karlos Capone M.D./General ETT    Surgical Staff:  Circulator: Jose Luis Lucero R.N.  Scrub Person: Justo Alfaro    Specimens removed if any:  None    Estimated Blood Loss: N/A    Findings: 8 mm calcium oxalate dihydrate appearing stone fragmented into powder  Complications: None        6/13/2022 10:09 PM Rigo Conde M.D.

## 2022-06-14 NOTE — ASSESSMENT & PLAN NOTE
Not currently in exacerbation  Oxygen as needed, Respiratory protocol, Bronchodilators, Incentive spirometry

## 2022-06-14 NOTE — PROGRESS NOTES
Pt discharged to home per MD orders. IV removed. Pt verbalized understanding of discharge instructions, educational materials, as well as follow up appointments, medications, and prescriptions. All questions answered. Pt discharged off the unit with escort via wheelchair.

## 2022-06-14 NOTE — CARE PLAN
The patient is Stable - Low risk of patient condition declining or worsening    Shift Goals  Clinical Goals: (P) Pt's pain will remain at her comfort level of 3/10; monitor color and clarity of urine output  Patient Goals: (P) discharge to home    Progress made toward(s) clinical / shift goals:  Patient did not complain of any pain during assessment this morning. She said no pain or blood during urination. Physician said patient is good for discharge. Urology assistant came saw the patient and also and she said she is ok for discharge.      Problem: Knowledge Deficit - Standard  Goal: Patient and family/care givers will demonstrate understanding of plan of care, disease process/condition, diagnostic tests and medications  Outcome: Progressing     Problem: Discharge Barriers/Planning  Goal: Patient's continuum of care needs are met  Outcome: Progressing     Problem: Fluid Volume  Goal: Fluid volume balance will be maintained  Outcome: Progressing

## 2022-06-14 NOTE — OP REPORT
DATE OF SERVICE:  06/13/2022        PREOPERATIVE DIAGNOSES:   1.  Right flank pain.  2.  Right ureteral stone.     OPERATIONS AND PROCEDURES PERFORMED:   1.  Rigid cystourethroscopy.  2.  Right flexible ureteroscopy with holmium laser lithotripsy of 8-9 mm   ureteral stone.     SURGEON:  Rigo Conde MD     ANESTHESIA:  General endotracheal.     ANESTHESIOLOGIST:  Karlos Capone MD     POSTOPERATIVE DIAGNOSES:  Above.     COMPLICATIONS:  None.     DRAINS:  None.     INDICATIONS:  This patient is a pleasant 56-year-old woman with a history of   right ureteral stone identified in late February 2022.  She has been seen by   Dr. Moss and scheduled for elective cystoscopy, ureteroscopy, laser   lithotripsy later this month, but presented to the ER with intractable right   flank pain and a 8-9 mm stone showing a hydroureteronephrosis.  I discussed   with the patient treatment options.  She wished to proceed with cystoscopy,   ureteroscopy, laser lithotripsy, possible stent and the risk of the procedure   including but not limited to risk of urinary tract infection, urosepsis,   ureteral perforation, inability to treat the stone requiring secondary   procedures, the fact that the stent will be positioned and failure to follow   through with stent removal can result in stone formation and loss of renal   function.  She is also aware of the perioperative risk of deep vein   thrombosis, pulmonary embolism, aspiration pneumonia, heart attack, stroke and   death.  Informed consent was given to me by the patient to proceed and she is   marked in the preoperative holding area and her right thigh with my initials   DH and Y for safe site surgery.     DESCRIPTION OF PROCEDURE:  After informed consent was obtained, the patient   was brought to the operating room and placed supine.  A general endotracheal   anesthetic was administered in a balanced fashion.  The patient received   weight-based Ancef and bilateral sequential  compression device in place and   operational.  She was positioned in modified lithotomy and the operative area   was Betadine prepped and draped in the usual sterile fashion.  A surgical   timeout was called.  All members of the operative team agree as the patient's   name, procedure to be performed and the fact it is right side. I did a spot   image of fluoroscopy, advanced a 0.38 Sensor wire up into the right kidney.  I   then over this passed the inner obturator of 11-Tongan tapered to 13-Tongan   ureteral access sheath and then advanced the entire ureteral access sheath   into the distal ureter and into the mid ureter.  I withdrew the inner   obturator and advanced a flexible disposable NERITES digital   ureteroscope up to the level of the yellowish stone where there was some edema   in the ureteral wall.  Stone was treated with 200 micron laser fiber at a   frequency of 10 Hz and 400 millijoules.  It was completely treated into   powder.  It was calcium oxalate dihydrate appearing stone and fragmented very   easily with minimal energy.  I then advanced the scope into the upper, mid and   lower pole.  She has hydronephrosis.  She has some Alhaji's plaques noted in   the upper, mid and lower pole, but no stones are seen.  Proximal ureter was   normal and where the stone was likely lodged.  Recently, she has some ureteral   edema, but is nonobstructive and as such, I went ahead and withdrew the scope   and evaluated the patient, which she was found to have efflux from the right   ureter with significant pressure. The entire ureter was stone free as the   stone had been washed out and I was able to advance the flexible scope without   a wire into the ureter as it was widely patent. At the end the case, I   advanced a cystoscope in the bladder, drained the bladder and evaluated the   right ureter, it was draining clear without evidence of obstruction, so I went   ahead and elected not to pass the stent.  At the end the case, the patient   tolerated the procedure well without complication, was awakened in the   operating room and transferred to recovery room where she arrived in stable   condition.              ______________________________  MD HECTOR Gutierrez/JORDAN    DD:  06/13/2022 22:22  DT:  06/13/2022 23:36    Job#:  [id number]

## 2022-06-14 NOTE — ANESTHESIA POSTPROCEDURE EVALUATION
Patient: Keshia Eaton    Procedure Summary     Date: 06/13/22 Room / Location:  OR  / SURGERY [facility]    Anesthesia Start: 2148 Anesthesia Stop: 2217    Procedures:       CYSTOSCOPY, WITH URETERAL STENT INSERTION (Right Ureter)      URETEROSCOPY (Right Ureter)      LITHOTRIPSY, USING LASER (Right Ureter) Diagnosis: (right ureteral stone)    Surgeons: Rigo Conde M.D. Responsible Provider: Karlos Capone M.D.    Anesthesia Type: general ASA Status: 2          Final Anesthesia Type: general  Last vitals  BP   Blood Pressure: 123/81    Temp   36.5 °C (97.7 °F)    Pulse   98   Resp   18    SpO2   96 %      Anesthesia Post Evaluation    Patient location during evaluation: PACU  Patient participation: complete - patient participated  Level of consciousness: sleepy but conscious  Pain score: 0    Airway patency: patent  Anesthetic complications: no  Cardiovascular status: hemodynamically stable  Respiratory status: acceptable  Hydration status: balanced    PONV: none          There were no known complications for this encounter.     Nurse Pain Score: 0 (NPRS)

## 2022-06-14 NOTE — PROGRESS NOTES
4 Eyes Skin Assessment Completed by ONELIA Valverde and ONELIA Young.    Head WDL  Ears WDL  Nose WDL  Mouth WDL  Neck WDL  Breast/Chest WDL  Shoulder Blades WDL  Spine WDL  (R) Arm/Elbow/Hand WDL  (L) Arm/Elbow/Hand WDL  Abdomen Scar  Groin WDL  Scrotum/Coccyx/Buttocks WDL  (R) Leg WDL  (L) Leg WDL  (R) Heel/Foot/Toe Jaundice  (L) Heel/Foot/Toe WDL          Devices In Places Pulse Ox      Interventions In Place N/A    Possible Skin Injury No    Pictures Uploaded Into Epic N/A  Wound Consult Placed N/A  RN Wound Prevention Protocol Ordered No

## 2022-06-14 NOTE — OR NURSING
2132: vd bedside report from ONELIA Ortiz and brought pt up to PACU for pre-op procedures. Pain is tolerable, no nausea, awake and alert, on room air.  2135: Pt up to RR, tolerated well.  2145: Patient allergies and NPO status verified, home medication reconciliation completed and belongings secured. Patient verbalizes understanding of pain scale, expected course of stay and plan of care. Surgical site verified with patient.PIV patency verified. Sequentials placed on legs.

## 2022-06-14 NOTE — H&P
"Hospital Medicine History & Physical Note    Date of Service  6/13/2022    Primary Care Physician  Shira Hernandez M.D.    Consultants  Uro, Dr Conde      Code Status  Full Code    Chief Complaint  Chief Complaint   Patient presents with   • Flank Pain     Reports R flank pain, dx with kidney stones and states \"I'm supposed to have surgery on 6/28 to break up the stones, I have a bunch of reflux up into my kidneys. Today the pain is just really bad\". Denies known fevers. Reports she was not given any medications for this pain.      History of Presenting Illness  Keshia Eaton is a 56 y.o. female with a past medical history of dyslipidemia, urinary problems, diabetes who presented 6/13/2022 with flank pain.  Reports that she has been having progressively worsening flank pain over the past 2 days.  Pain is severe rated as 10 out of 10.  Worse with movement.  The pain does not radiate to other places.  No relieving factors.  She denies having fevers but reports chills and myalgias.  Supposed to have lithotripsy on 6/28 with urology.    I discussed the plan of care with emergency department physician, and the patient    Review of Systems  Review of Systems   Constitutional: Positive for chills and malaise/fatigue. Negative for fever.   Eyes: Negative for discharge and redness.   Respiratory: Negative for cough, shortness of breath and stridor.    Cardiovascular: Negative for chest pain and leg swelling.   Gastrointestinal: Negative for abdominal pain and vomiting.   Genitourinary: Positive for flank pain.   Musculoskeletal: Negative for myalgias.   Skin: Negative.    Neurological: Negative for focal weakness.   Endo/Heme/Allergies: Does not bruise/bleed easily.   Psychiatric/Behavioral: The patient is not nervous/anxious.      Past Medical History   has a past medical history of ASTHMA, Breath shortness, Depression, Diabetes (HCC), GERD (gastroesophageal reflux disease), Heart burn, Indigestion, Kidney stone, " Obstructive sleep apnea, Renal disorder, and Unspecified disorder of thyroid.    Surgical History   has a past surgical history that includes mammoplasty augmentation (dec 2008); other abdominal surgery (dec 2008); cystoscopy (7/12/2012); ureteroscopy (7/12/2012); lasertripsy (7/12/2012); pr breast augmentation with implant; and pr removal of prox row carpal bones (Right, 11/30/2021).     Family History  family history includes Diabetes in her mother; Hypertension in her mother; Stroke in her father and mother.      Social History   reports that she quit smoking about 12 years ago. She has never used smokeless tobacco. She reports current alcohol use. She reports that she does not use drugs.    Allergies  Allergies   Allergen Reactions   • Nkda [No Known Drug Allergy]      Medications  Prior to Admission Medications   Prescriptions Last Dose Informant Patient Reported? Taking?   Cholecalciferol (VITAMIN D) 2000 units Cap  Patient Yes No   Sig: Take 2,000 Units by mouth every day.   Semaglutide (OZEMPIC) 0.25 or 0.5 MG/DOSE Solution Pen-injector  Patient Yes No   Sig: Inject 0.25 mg as instructed every thursday. Inject 0.25 mg weekly for 4 weeks the 0.5 mg weekly from then on   albuterol (VENTOLIN OR PROVENTIL) 108 (90 BASE) MCG/ACT AERS  Patient Yes No   Sig: Inhale 2 Puffs by mouth every 6 hours as needed for Shortness of Breath.   atorvastatin (LIPITOR) 10 MG Tab   Yes No   Sig: Take 10 mg by mouth every day.   buPROPion (WELLBUTRIN XL) 150 MG XL tablet  Patient Yes No   Sig: Take 150 mg by mouth every morning. Take 150 mg tab with 300 mg tab for a total dose of 450 mg daily   buPROPion (WELLBUTRIN XL) 300 MG XL tablet  Patient Yes No   Sig: Take 300 mg by mouth every morning. Take 300 mg tab with 150 mg tab for a total dose of 450 mg daily   duloxetine (CYMBALTA) 30 MG Cap DR Particles  Patient Yes No   Sig: Take 30 mg by mouth every day.   ibuprofen (MOTRIN) 200 MG Tab  Patient Yes No   Sig: Take 600 mg by mouth  every 6 hours as needed for Mild Pain.   meloxicam (MOBIC) 15 MG tablet   No No   Sig: TAKE 1 TABLET BY MOUTH EVERY DAY   metFORMIN ER (GLUCOPHAGE XR) 500 MG TABLET SR 24 HR  Patient Yes No   Sig: Take 500 mg by mouth every day.   mometasone (ASMANEX 120 METERED DOSES) 220 MCG/INH inhaler  Patient Yes No   Sig: Inhale 1 Puff by mouth every day.   omeprazole (PRILOSEC) 40 MG delayed-release capsule  Patient Yes No   Sig: Take 40 mg by mouth every day.   ondansetron (ZOFRAN ODT) 4 MG TABLET DISPERSIBLE   No No   Sig: Take 1 Tablet by mouth every 8 hours as needed for Nausea.   ondansetron (ZOFRAN) 4 MG Tab tablet   Yes No   Sig: Take 4 mg by mouth 1 time a day as needed.   thyroid (ARMOUR THYROID) 60 MG Tab  Patient Yes No   Sig: Take 60 mg by mouth every day.      Facility-Administered Medications: None     Physical Exam  Temp:  [36.7 °C (98 °F)-37.2 °C (98.9 °F)] 36.9 °C (98.4 °F)  Pulse:  [] 91  Resp:  [18-20] 18  BP: (154-163)/() 154/91  SpO2:  [96 %-97 %] 96 %  Blood Pressure: (!) 160/107   Temperature: 36.7 °C (98 °F)   Pulse: 92   Respiration: 20   Pulse Oximetry: 96 %     Physical Exam  Constitutional:       General: She is not in acute distress.  HENT:      Head: Normocephalic and atraumatic.      Right Ear: External ear normal.      Left Ear: External ear normal.      Nose: No congestion or rhinorrhea.      Mouth/Throat:      Mouth: Mucous membranes are dry.      Pharynx: No oropharyngeal exudate or posterior oropharyngeal erythema.   Eyes:      General: No scleral icterus.        Right eye: No discharge.         Left eye: No discharge.      Conjunctiva/sclera: Conjunctivae normal.      Pupils: Pupils are equal, round, and reactive to light.   Cardiovascular:      Rate and Rhythm: Tachycardia present.      Heart sounds:     No friction rub. No gallop.   Pulmonary:      Effort: Pulmonary effort is normal.   Abdominal:      General: Abdomen is flat. There is no distension.      Tenderness: There is  right CVA tenderness. There is no guarding.   Musculoskeletal:         General: No swelling.      Cervical back: Neck supple. No rigidity. No muscular tenderness.      Right lower leg: No edema.      Left lower leg: No edema.   Skin:     General: Skin is dry.      Capillary Refill: Capillary refill takes 2 to 3 seconds.      Coloration: Skin is pale. Skin is not jaundiced.      Findings: No bruising or erythema.   Neurological:      Mental Status: She is alert and oriented to person, place, and time.   Psychiatric:         Mood and Affect: Mood normal.         Judgment: Judgment normal.       Laboratory:  Recent Labs     06/13/22  1636   WBC 11.4*   RBC 4.72   HEMOGLOBIN 12.3   HEMATOCRIT 38.5   MCV 81.6   MCH 26.1*   MCHC 31.9*   RDW 44.0   PLATELETCT 455*   MPV 10.0     Recent Labs     06/13/22  1636   SODIUM 139   POTASSIUM 4.3   CHLORIDE 104   CO2 20   GLUCOSE 108*   BUN 15   CREATININE 0.73   CALCIUM 9.7     Recent Labs     06/13/22  1636   ALTSGPT 16   ASTSGOT 13   ALKPHOSPHAT 110*   TBILIRUBIN 0.3   LIPASE 24   GLUCOSE 108*         No results for input(s): NTPROBNP in the last 72 hours.      No results for input(s): TROPONINT in the last 72 hours.    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 No    (Results Pending)     Assessment/Plan:  Justification for Admission Status  I anticipate this patient is appropriate for observation status at this time because likely discharge after one midnight     * Ureteral stone- (present on admission)  Assessment & Plan  Imaging shows worsening severe right hydroureteronephrosis with slight distal migration of the 8 mm    Urology, Dr. Conde consulted. Plan  for lithotripsy  IVF, Multimodal pain control    Abnormal CT of the abdomen  Assessment & Plan  CT shows medullary nephrocalcinosis likely indicating hypercalcemia, renal tubular acidosis or medullary sponge kidney   The findings have been discussed with the patient  Ca WNL, will check PTH, & Vit D  Will likely need a modified diet, will need to follow up with urology and PCP     Flank pain- (present on admission)  Assessment & Plan  Imaging shows worsening severe right hydroureteronephrosis with slight distal migration of the 8 mm   Multimodal pain control  Plan for lithotripsy    Hydronephrosis, right- (present on admission)  Assessment & Plan  Imaging shows worsening severe right hydroureteronephrosis with slight distal migration of the 8 mm    Urology, Dr. Conde consulted  Plan for lithotripsy  IVF   Multimodal pain control    Mixed hyperlipidemia- (present on admission)  Assessment & Plan  Resume home atorvastatin    Gastroesophageal reflux disease without esophagitis- (present on admission)  Assessment & Plan  Resume home omeprazole    Mild intermittent asthma without complication- (present on admission)  Assessment & Plan  Not currently in exacerbation  Oxygen as needed, Respiratory protocol, Bronchodilators, Incentive spirometry     VTE prophylaxis: SCDs/TEDs, plan for surgery

## 2022-06-14 NOTE — PROGRESS NOTES
"Note to reader: this note follows the APSO format rather than the historical SOAP format. Assessment and plan located at the top of the note for ease of use.    Chief Complaint  56 y.o. year old female here with Flank Pain (Reports R flank pain, dx with kidney stones and states \"I'm supposed to have surgery on 6/28 to break up the stones, I have a bunch of reflux up into my kidneys. Today the pain is just really bad\". Denies known fevers. Reports she was not given any medications for this pain. )      Assessment/Plan  Interval History   57 yo F with R ureteral stone s/p R CULTS with Dr. Conde on 6/13.     Plan:  - Patient is OK for DC when medically cleared. Urology will sign off.   - We will plan for RBUS in office in 6 weeks.   - Please contact us with questions    Patient seen and examined    6/14. POD 1. Feeling much improved. No N/V. Has not yet eaten. Ambulating without issue. C/o urinary frequency.          Review of Systems  Physical Exam   Review of Systems   Constitutional: Negative for chills and fever.   Gastrointestinal: Negative for abdominal pain, nausea and vomiting.   Genitourinary: Positive for frequency. Negative for flank pain.     Vitals:    06/14/22 0030 06/14/22 0100 06/14/22 0500 06/14/22 0700   BP: (!) 141/74 138/76 123/81    Pulse: 100 100 100 98   Resp:   18    Temp:   36.5 °C (97.7 °F)    TempSrc:   Oral    SpO2: 90% 90% 97% 96%   Weight:       Height:         Physical Exam  Vitals and nursing note reviewed.   Constitutional:       Appearance: Normal appearance.   HENT:      Head: Normocephalic and atraumatic.      Mouth/Throat:      Mouth: Mucous membranes are moist.   Abdominal:      General: Abdomen is flat.   Neurological:      General: No focal deficit present.      Mental Status: She is alert and oriented to person, place, and time.   Psychiatric:         Behavior: Behavior normal.         Thought Content: Thought content normal.          Hematology Chemistry   Lab Results "   Component Value Date/Time    WBC 10.0 06/14/2022 03:34 AM    HEMOGLOBIN 11.7 (L) 06/14/2022 03:34 AM    HEMATOCRIT 36.1 (L) 06/14/2022 03:34 AM    PLATELETCT 423 06/14/2022 03:34 AM     Lab Results   Component Value Date/Time    SODIUM 139 06/14/2022 03:34 AM    POTASSIUM 4.4 06/14/2022 03:34 AM    CHLORIDE 104 06/14/2022 03:34 AM    CO2 21 06/14/2022 03:34 AM    GLUCOSE 154 (H) 06/14/2022 03:34 AM    BUN 13 06/14/2022 03:34 AM    CREATININE 0.74 06/14/2022 03:34 AM    CREATININE 1.0 04/08/2008 02:57 AM         Labs not explicitly included in this progress note were reviewed by the author.   Radiology/imaging not explicitly included in this progress note was reviewed by the author.     Medications reviewed, Labs reviewed and Radiology images reviewed

## 2022-06-14 NOTE — ANESTHESIA PREPROCEDURE EVALUATION
Case: [id number] Date/Time: 06/13/22 2115    Procedures:       CYSTOSCOPY, WITH URETERAL STENT INSERTION (Right )      URETEROSCOPY      LITHOTRIPSY, USING LASER    Location:  OR 03 / SURGERY [facility]    Surgeons: Rigo Conde M.D.          Relevant Problems   ANESTHESIA   (positive) Obstructive sleep apnea      PULMONARY   (positive) Mild intermittent asthma without complication      GI   (positive) Gastroesophageal reflux disease without esophagitis         (positive) Hydronephrosis, right      Other   (positive) Mixed hyperlipidemia   (positive) Uncontrolled type 2 diabetes mellitus with hyperglycemia (HCC)       Physical Exam    Airway   Mallampati: II  TM distance: >3 FB  Neck ROM: full       Cardiovascular - normal exam  Rhythm: regular  Rate: normal  (-) murmur     Dental - normal exam           Pulmonary - normal exam  Breath sounds clear to auscultation     Abdominal    Neurological - normal exam                 Anesthesia Plan    ASA 2       Plan - general       Airway plan will be ETT          Induction: intravenous    Postoperative Plan: Postoperative administration of opioids is intended.    Pertinent diagnostic labs and testing reviewed    Informed Consent:    Anesthetic plan and risks discussed with patient.

## 2022-06-14 NOTE — ANESTHESIA PROCEDURE NOTES
Airway  Performed by: Karlos Capone M.D.  Authorized by: Karlos Capone M.D.     Location:  OR  Urgency:  Elective  Indications for Airway Management:  Anesthesia      Spontaneous Ventilation: absent    Sedation Level:  Deep  Preoxygenated: Yes    Patient Position:  Sniffing  Final Airway Type:  Endotracheal airway  Final Endotracheal Airway:  ETT  Cuffed: Yes    Technique Used for Successful ETT Placement:  Video laryngoscopy  Devices/Methods Used in Placement:  Intubating stylet and cricoid pressure    Insertion Site:  Oral  Blade Type:  Glide  Laryngoscope Blade/Videolaryngoscope Blade Size:  4  ETT Size (mm):  7.0  Measured from:  Teeth  ETT to Teeth (cm):  21  Placement Verified by: auscultation and capnometry    Cormack-Lehane Classification:  Grade I - full view of glottis  Number of Attempts at Approach:  1

## 2022-06-14 NOTE — CARE PLAN
The patient is Stable - Low risk of patient condition declining or worsening    Shift Goals  Clinical Goals: pt will remain at stated pain goal of 3/10 pain this shift. pt will void before 5AM.  Patient Goals: pain management    Progress made toward(s) clinical / shift goals:      Non-pharmacological pain management allow pt to remain at stated pain goal of 3/10 pain this shift.    Pt has voided prior to 5AM.       Problem: Knowledge Deficit - Standard  Goal: Patient and family/care givers will demonstrate understanding of plan of care, disease process/condition, diagnostic tests and medications  6/14/2022 0206 by Rigo Moss R.N.  Outcome: Progressing  6/14/2022 0203 by Rigo Moss R.N.  Outcome: Progressing       Patient is not progressing towards the following goals:

## 2022-06-14 NOTE — PROGRESS NOTES
56 year old female with DM and known right ureteral stone. Developed severe right flank pain and has an 8mm ureteral stone with significant right hydroureteronephrosis. Discussed with patient risks, benefits and alternative and she has given me informed consent to proceed with cystoscopy, right ureteroscopy with laser lithotripsy and possible right stent.

## 2022-06-14 NOTE — ED PROVIDER NOTES
"ED Provider Note    CHIEF COMPLAINT  Chief Complaint   Patient presents with   • Flank Pain     Reports R flank pain, dx with kidney stones and states \"I'm supposed to have surgery on  to break up the stones, I have a bunch of reflux up into my kidneys. Today the pain is just really bad\". Denies known fevers. Reports she was not given any medications for this pain.        HPI  Keshia Eaton is a 56 y.o. female who presents to the emergency department for recurrent right flank pain. Past medical history as documented below to include significant past medical history of known prior kidney stones. She notes that she is currently working with urology [place] for possible stone extraction later this month. She knows that she is moving to [place] in approximate one month and therefore the surgery date was moved up to approximately two weeks from now. She does that the pain has been waxing and waning and is predominantly on the right side. No notable aggravated leaving factors. Pain has been the same to prior kidney stones. Today since noon however she is had more constant mild to moderate discomfort with occasional more severe pain. Denies any new urinary symptoms. No associated nausea vomiting. No fever chills. I did attend to contact the urology office but ultimately had to come the emergency room due to the persistence of her pain today.    Currently working with Dr. Moss    REVIEW OF SYSTEMS  See HPI for further details. All other systems are negative.     PAST MEDICAL HISTORY   has a past medical history of ASTHMA, Breath shortness, Depression, Diabetes (HCC), GERD (gastroesophageal reflux disease), Heart burn, Indigestion, Kidney stone, Obstructive sleep apnea, Renal disorder, and Unspecified disorder of thyroid.    SOCIAL HISTORY  Social History     Tobacco Use   • Smoking status: Former Smoker     Quit date: 2010     Years since quittin.4   • Smokeless tobacco: Never Used   • Tobacco comment: " "1 month ago   Substance and Sexual Activity   • Alcohol use: Yes     Comment: occ   • Drug use: No   • Sexual activity: Yes     Partners: Male       SURGICAL HISTORY   has a past surgical history that includes mammoplasty augmentation (dec 2008); other abdominal surgery (dec 2008); cystoscopy (7/12/2012); ureteroscopy (7/12/2012); lasertripsy (7/12/2012); breast augmentation with implant; and removal of prox row carpal bones (Right, 11/30/2021).    CURRENT MEDICATIONS  Home Medications    **Home medications have not yet been reviewed for this encounter**         ALLERGIES  Allergies   Allergen Reactions   • Nkda [No Known Drug Allergy]        PHYSICAL EXAM  VITAL SIGNS: BP (!) 160/107   Pulse 92   Temp 36.7 °C (98 °F) (Temporal)   Resp 20   Ht 1.575 m (5' 2\")   Wt 73.8 kg (162 lb 11.2 oz)   LMP 11/18/2016   SpO2 96%   BMI 29.76 kg/m²  @ERENDIRA[[id number]::@   Pulse ox interpretation: I interpret this pulse ox as normal.  Constitutional: Alert in no apparent distress.  HENT: No signs of trauma, Bilateral external ears normal, Nose normal.   Eyes: Pupils are equal and reactive  Neck: Normal range of motion, No tenderness, Supple  Cardiovascular: Regular rate and rhythm, no murmurs.   Thorax & Lungs: Normal breath sounds, No respiratory distress, No wheezing, No chest tenderness.   Abdomen: Bowel sounds normal, Soft, No tenderness  Skin: Warm, Dry, No erythema, No rash.   Back: No bony tenderness, No CVA tenderness.   Extremities: Intact distal pulses  Musculoskeletal: Good range of motion in all major joints. No tenderness to palpation or major deformities noted.   Neurologic: Alert , Normal motor function, Normal sensory function, No focal deficits noted.   Psychiatric: Affect normal, Judgment normal, Mood normal.       DIAGNOSTIC STUDIES / PROCEDURES    LABS  Results for orders placed or performed during the hospital encounter of 06/13/22   CBC WITH DIFFERENTIAL   Result Value Ref Range    WBC 11.4 (H) 4.8 - 10.8 " K/uL    RBC 4.72 4.20 - 5.40 M/uL    Hemoglobin 12.3 12.0 - 16.0 g/dL    Hematocrit 38.5 37.0 - 47.0 %    MCV 81.6 81.4 - 97.8 fL    MCH 26.1 (L) 27.0 - 33.0 pg    MCHC 31.9 (L) 33.6 - 35.0 g/dL    RDW 44.0 35.9 - 50.0 fL    Platelet Count 455 (H) 164 - 446 K/uL    MPV 10.0 9.0 - 12.9 fL    Neutrophils-Polys 78.10 (H) 44.00 - 72.00 %    Lymphocytes 14.20 (L) 22.00 - 41.00 %    Monocytes 6.10 0.00 - 13.40 %    Eosinophils 1.00 0.00 - 6.90 %    Basophils 0.20 0.00 - 1.80 %    Immature Granulocytes 0.40 0.00 - 0.90 %    Nucleated RBC 0.00 /100 WBC    Neutrophils (Absolute) 8.88 (H) 2.00 - 7.15 K/uL    Lymphs (Absolute) 1.61 1.00 - 4.80 K/uL    Monos (Absolute) 0.69 0.00 - 0.85 K/uL    Eos (Absolute) 0.11 0.00 - 0.51 K/uL    Baso (Absolute) 0.02 0.00 - 0.12 K/uL    Immature Granulocytes (abs) 0.04 0.00 - 0.11 K/uL    NRBC (Absolute) 0.00 K/uL   COMP METABOLIC PANEL   Result Value Ref Range    Sodium 139 135 - 145 mmol/L    Potassium 4.3 3.6 - 5.5 mmol/L    Chloride 104 96 - 112 mmol/L    Co2 20 20 - 33 mmol/L    Anion Gap 15.0 7.0 - 16.0    Glucose 108 (H) 65 - 99 mg/dL    Bun 15 8 - 22 mg/dL    Creatinine 0.73 0.50 - 1.40 mg/dL    Calcium 9.7 8.4 - 10.2 mg/dL    AST(SGOT) 13 12 - 45 U/L    ALT(SGPT) 16 2 - 50 U/L    Alkaline Phosphatase 110 (H) 30 - 99 U/L    Total Bilirubin 0.3 0.1 - 1.5 mg/dL    Albumin 4.8 3.2 - 4.9 g/dL    Total Protein 8.0 6.0 - 8.2 g/dL    Globulin 3.2 1.9 - 3.5 g/dL    A-G Ratio 1.5 g/dL   LIPASE   Result Value Ref Range    Lipase 24 7 - 58 U/L   URINALYSIS,CULTURE IF INDICATED    Specimen: Urine   Result Value Ref Range    Color Yellow     Character Hazy (A)     Specific Gravity >=1.030 <1.035    Ph 6.0 5.0 - 8.0    Glucose Negative Negative mg/dL    Ketones 15 (A) Negative mg/dL    Protein Negative Negative mg/dL    Bilirubin Negative Negative    Nitrite Negative Negative    Leukocyte Esterase Negative Negative    Occult Blood Moderate (A) Negative    Micro Urine Req Microscopic    ESTIMATED  GFR   Result Value Ref Range    GFR (CKD-EPI) 97 >60 mL/min/1.73 m 2   URINE MICROSCOPIC (W/UA)   Result Value Ref Range    WBC 0-2 /hpf    RBC 5-10 (A) /hpf    Bacteria Few (A) None /hpf    Epithelial Cells Few Few /hpf    Mucous Threads Few /hpf    Ca Oxalate Crystal Few /hpf         RADIOLOGY  CT-RENAL COLIC EVALUATION(A/P W/O)   Final Result      Mild worsening severe right hydroureteronephrosis with slight distal migration of the 8 mm urolith      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 Will bring in for definitive care.     COURSE & MEDICAL DECISION MAKING  Pertinent Labs & Imaging studies reviewed. (See chart for details)    56-year-old female presented emergency room with acute on chronic flank pain consistent with her prior kidney stone discomfort. CT imaging above shows an 8 mm ureteral thigh assist with increasing hydrogen process. Remaining workup as above. I discussed the case with urologist on call which will take the patient to OR once able to schedule. In the meantime patient be brought into the hospital service given her other underlying medical conditions to be dealt with [facility] and postoperatively.    FINAL IMPRESSION  1. Ureterolithiasis            Electronically signed by: George Del Valle M.D., 6/13/2022 6:47 PM

## 2022-06-14 NOTE — ASSESSMENT & PLAN NOTE
Imaging shows worsening severe right hydroureteronephrosis with slight distal migration of the 8 mm   Multimodal pain control  Plan for lithotripsy

## 2022-06-14 NOTE — ASSESSMENT & PLAN NOTE
Imaging shows worsening severe right hydroureteronephrosis with slight distal migration of the 8 mm    Urology, Dr. Conde consulted. Plan for lithotripsy  IVF, Multimodal pain control

## 2022-06-14 NOTE — CONSULTS
DATE OF SERVICE:  06/13/2022     UROLOGIC CONSULTATION     CONSULTATION REQUESTED BY:  Dr. Del Valle, emergency room physician.     CONSULTATION PERFORMED BY:  Rigo Conde MD     CHIEF COMPLAINT:  This is a 56-year-old woman with known history of recurrent   stones with intractable right flank pain.     HISTORY OF PRESENT ILLNESS:  The patient is a pleasant 56-year-old woman with   a history of diabetes mellitus, dyslipidemia, right flank pain.  She has a   history of recurrent nephrolithiasis with stone previously treated in 2012.    In February of this year, she presented and underwent a CAT scan showing a 6   mm stone.  This has failed to pass and she was scheduled to have treatment   with Dr. Moss later in the month but developed intractable right flank   pain, with nausea and vomiting could not get comfortable and went to the   emergency room.  She denies any fever or chills.  She does not have any   dysuria, urgency, frequency.     PAST MEDICAL HISTORY:  Noteworthy for asthma, history of depression, diabetes   mellitus, gastroesophageal reflux disease.  She has a history of recurrent   nephrolithiasis, obstructive sleep apnea and hypothyroidism.     PAST SURGICAL HISTORY:  She has had a ureteroscopy, laser lithotripsy back in   2012.  She has a history of breast augmentation and a history of orthopedic   procedures on the right hand.     MEDICATIONS:  Include vitamin D, Ozempic, albuterol, Lipitor, Wellbutrin,   Cymbalta, Motrin, Mobic, metformin, Asmanex, Prilosec and Zofran as well as   [place] Thyroid.     ALLERGIES:  She denies any drug allergies.     FAMILY HISTORY:  The patient has a history of diabetes in her mother,   hypertension in mother as well as stroke in both her mother and father.     SOCIAL HISTORY:  She quit smoking 12 years ago.  She does not currently use   alcohol, intravenous drugs or smoke.  She has a son with a known cerebral   palsy and recurrent stone disease.     REVIEW OF  SYSTEMS:  The patient denies any fever, chills, recent weight loss   or weight gain.  She has had right flank pain with nausea but denies any   current shortness of breath, chest pain.  She denies any skipped heartbeats,   any dyspnea on exertion.  She denies any gross hematuria.  She denies any   arthralgias or myalgias.  Denies any focal weakness, bleeding abnormality.  PSYCHIATRIC:  She is denying any anxiety.     PHYSICAL EXAMINATION:  GENERAL:  She is a well-developed, well-nourished woman in no acute distress.  HEENT:  Normocephalic, atraumatic.  Pupils equal, round.  Mucous membranes are   dry.  CARDIOVASCULAR:  Regular rate and rhythm without a murmur.  ABDOMEN:  Soft, nondistended.  She has no tenderness.  BACK:  With right costovertebral area tenderness.  No guarding.  MUSCULOSKELETAL:  No clubbing, cyanosis, edema.  SKIN:  Dry.  Capillary refill is 2 to 3 seconds.  NEUROLOGIC:  Her mental status is appropriate.  Cranial nerves II-XII intact.    Motor and sensory examination nonfocal.  PSYCHIATRIC:  Appropriate mood and judgment.     LABORATORY STUDIES:  Her white count is 11,400.  Her creatinine is 0.73 with a   glucose of 108.     DIAGNOSTIC DATA:  She has a CT scan, which I personally reviewed, shows   significant hydroureteronephrosis down to a ureteral stone.  It is about 8-9   mm in size.     ASSESSMENT:  Right ureteral stone with failure to pass.     PLAN AND RECOMMENDATION:  I discussed with the patient treatment options.  She   wished to proceed with cystoscopy, ureteroscopy, laser lithotripsy with   possible stent.  Informed consent was given to me to proceed.        ______________________________  MD HECTOR Gutierrez/THEODORE    DD:  06/13/2022 22:17  DT:  06/13/2022 23:12    Job#:  [id number]

## 2022-06-14 NOTE — ED NOTES
Med rec completed per interview with the patient.   Allergies reviewed.       Stephanie Seo, PharmD

## 2022-06-14 NOTE — DISCHARGE INSTRUCTIONS
Discharge Instructions    Discharged to home by car with relative. Discharged via wheelchair, hospital escort: Yes.  Special equipment needed: Not Applicable    Be sure to schedule a follow-up appointment with your primary care doctor or any specialists as instructed.     Discharge Plan:   Diet Plan: (P) Discussed  Activity Level: (P) Discussed  Confirmed Follow up Appointment: (P) Patient to Call and Schedule Appointment  Confirmed Symptoms Management: (P) Discussed  Medication Reconciliation Updated: (P) Yes    I understand that a diet low in cholesterol, fat, and sodium is recommended for good health. Unless I have been given specific instructions below for another diet, I accept this instruction as my diet prescription.   Other diet: Resume home diet    Special Instructions: None    Is patient discharged on Warfarin / Coumadin?   No         Depression / Suicide Risk    As you are discharged from this [facility] facility, it is important to learn how to keep safe from harming yourself.    Recognize the warning signs:  Abrupt changes in personality, positive or negative- including increase in energy   Giving away possessions  Change in eating patterns- significant weight changes-  positive or negative  Change in sleeping patterns- unable to sleep or sleeping all the time   Unwillingness or inability to communicate  Depression  Unusual sadness, discouragement and loneliness  Talk of wanting to die  Neglect of personal appearance   Rebelliousness- reckless behavior  Withdrawal from people/activities they love  Confusion- inability to concentrate     If you or a loved one observes any of these behaviors or has concerns about self-harm, here's what you can do:  Talk about it- your feelings and reasons for harming yourself  Remove any means that you might use to hurt yourself (examples: pills, rope, extension cords, firearm)  Get professional help from the community (Mental Health, Substance Abuse, psychological  counseling)  Do not be alone:Call your Safe Contact- someone whom you trust who will be there for you.  Call your local CRISIS HOTLINE 301-9864 or 204-046-7446  Call your local Children's Mobile Crisis Response Team [place] (970) 818-5031 or www.[employer]Lemon  Call the toll free National Suicide Prevention Hotlines   National Suicide Prevention Lifeline 975-188-NTEJ (1637)  [place] Line Network 800-SUICIDE (155-2582)    Kidney Stones    Kidney stones (urolithiasis) are rock-like masses that form inside of the kidneys. Kidneys are organs that make pee (urine). A kidney stone can cause very bad pain and can block the flow of pee. The stone usually leaves your body (passes) through your pee. You may need to have a doctor take out the stone.  Follow these instructions at home:  Eating and drinking  Drink enough fluid to keep your pee clear or pale yellow. This will help you pass the stone.  If told by your doctor, change the foods you eat (your diet). This may include:  Limiting how much salt (sodium) you eat.  Eating more fruits and vegetables.  Limiting how much meat, poultry, fish, and eggs you eat.  Follow instructions from your doctor about eating or drinking restrictions.  General instructions  Collect pee samples as told by your doctor. You may need to collect a pee sample:  24 hours after a stone comes out.  8-12 weeks after a stone comes out, and every 6-12 months after that.  Strain your pee every time you pee (urinate), for as long as told. Use the strainer that your doctor recommends.  Do not throw out the stone. Keep it so that it can be tested by your doctor.  Take over-the-counter and prescription medicines only as told by your doctor.  Keep all follow-up visits as told by your doctor. This is important. You may need follow-up tests.  Preventing kidney stones  To prevent another kidney stone:  Drink enough fluid to keep your pee clear or pale yellow. This is the best way to prevent kidney  stones.  Eat healthy foods.  Avoid certain foods as told by your doctor. You may be told to eat less protein.  Stay at a healthy weight.  Contact a doctor if:  You have pain that gets worse or does not get better with medicine.  Get help right away if:  You have a fever or chills.  You get very bad pain.  You get new pain in your belly (abdomen).  You pass out (faint).  You cannot pee.  This information is not intended to replace advice given to you by your health care provider. Make sure you discuss any questions you have with your health care provider.  Document Released: 06/05/2009 Document Revised: 07/30/2019 Document Reviewed: 09/05/2017  [employer] Patient Education © 2020 [employer]    Lithotripsy, Care After  This sheet gives you information about how to care for yourself after your procedure. Your health care provider may also give you more specific instructions. If you have problems or questions, contact your health care provider.  What can I expect after the procedure?  After the procedure, it is common to have:  Some blood in your urine. This should only last for a few days.  Soreness in your back, sides, or upper abdomen for a few days.  Blotches or bruises on your back where the pressure wave entered the skin.  Pain, discomfort, or nausea when pieces (fragments) of the kidney stone move through the tube that carries urine from the kidney to the bladder (ureter). Stone fragments may pass soon after the procedure, but they may continue to pass for up to 4-8 weeks.  If you have severe pain or nausea, contact your health care provider. This may be caused by a large stone that was not broken up, and this may mean that you need more treatment.  Some pain or discomfort during urination.  Some pain or discomfort in the lower abdomen or (in men) at the base of the penis.  Follow these instructions at home:  Medicines  Take over-the-counter and prescription medicines only as told by your health care  provider.  If you were prescribed an antibiotic medicine, take it as told by your health care provider. Do not stop taking the antibiotic even if you start to feel better.  Do not drive for 24 hours if you were given a medicine to help you relax (sedative).  Do not drive or use heavy machinery while taking prescription pain medicine.  Eating and drinking         Drink enough water and fluids to keep your urine clear or pale yellow. This helps any remaining pieces of the stone to pass. It can also help prevent new stones from forming.  Eat plenty of fresh fruits and vegetables.  Follow instructions from your health care provider about eating and drinking restrictions. You may be instructed:  To reduce how much salt (sodium) you eat or drink. Check ingredients and nutrition facts on packaged foods and beverages.  To reduce how much meat you eat.  Eat the recommended amount of calcium for your age and gender. Ask your health care provider how much calcium you should have.  General instructions  Get plenty of rest.  Most people can resume normal activities 1-2 days after the procedure. Ask your health care provider what activities are safe for you.  Your health care provider may direct you to lie in a certain position (postural drainage) and tap firmly (percuss) over your kidney area to help stone fragments pass. Follow instructions as told by your health care provider.  If directed, strain all urine through the strainer that was provided by your health care provider.  Keep all fragments for your health care provider to see. Any stones that are found may be sent to a medical lab for examination. The stone may be as small as a grain of salt.  Keep all follow-up visits as told by your health care provider. This is important.  Contact a health care provider if:  You have pain that is severe or does not get better with medicine.  You have nausea that is severe or does not go away.  You have blood in your urine longer than  your health care provider told you to expect.  You have more blood in your urine.  You have pain during urination that does not go away.  You urinate more frequently than usual and this does not go away.  You develop a rash or any other possible signs of an allergic reaction.  Get help right away if:  You have severe pain in your back, sides, or upper abdomen.  You have severe pain while urinating.  Your urine is very dark red.  You have blood in your stool (feces).  You cannot pass any urine at all.  You feel a strong urge to urinate after emptying your bladder.  You have a fever or chills.  You develop shortness of breath, difficulty breathing, or chest pain.  You have severe nausea that leads to persistent vomiting.  You faint.  Summary  After this procedure, it is common to have some pain, discomfort, or nausea when pieces (fragments) of the kidney stone move through the tube that carries urine from the kidney to the bladder (ureter). If this pain or nausea is severe, however, you should contact your health care provider.  Most people can resume normal activities 1-2 days after the procedure. Ask your health care provider what activities are safe for you.  Drink enough water and fluids to keep your urine clear or pale yellow. This helps any remaining pieces of the stone to pass, and it can help prevent new stones from forming.  If directed, strain your urine and keep all fragments for your health care provider to see. Fragments or stones may be as small as a grain of salt.  Get help right away if you have severe pain in your back, sides, or upper abdomen or have severe pain while urinating.  This information is not intended to replace advice given to you by your health care provider. Make sure you discuss any questions you have with your health care provider.  Document Released: 01/06/2009 Document Revised: 03/30/2020 Document Reviewed: 11/08/2017  [employer] Patient Education © 2020 [employer]  Cystoscopy,  Care After  Refer to this sheet in the next few weeks. These instructions provide you with information on caring for yourself after your procedure. Your caregiver may also give you more specific instructions. Your treatment has been planned according to current medical practices, but problems sometimes occur. Call your caregiver if you have any problems or questions after your procedure.  HOME CARE INSTRUCTIONS   Things you can do to ease any discomfort after your procedure include:  Drinking enough water and fluids to keep your urine clear or pale yellow.  Taking a warm bath to relieve any burning feelings.  SEEK IMMEDIATE MEDICAL CARE IF:   You have an increase in blood in your urine.  You notice blood clots in your urine.  You have difficulty passing urine.  You have the chills.  You have abdominal pain.  You have a fever or persistent symptoms for more than 2-3 days.  You have a fever and your symptoms suddenly get worse.  MAKE SURE YOU:   Understand these instructions.  Will watch your condition.  Will get help right away if you are not doing well or get worse.     This information is not intended to replace advice given to you by your health care provider. Make sure you discuss any questions you have with your health care provider.     Document Released: 07/07/2006 Document Revised: 01/08/2016 Document Reviewed: 06/10/2013  [employer] Patient Education ©2016 [employer]

## 2022-06-14 NOTE — PROGRESS NOTES
Received report from nightshift RN. Pt is A&O x 4, on RA, resting comfortably in bed. Pt reported no pain and blood when urinating, only scant blood when wiping. Pt denies any pain at this time. Updated with POC, call light within reach, all needs met at this time.

## 2022-06-14 NOTE — ANESTHESIA TIME REPORT
Anesthesia Start and Stop Event Times     Date Time Event    6/13/2022 2143 Ready for Procedure     2148 Anesthesia Start     2217 Anesthesia Stop        Responsible Staff  06/13/22    Name Role Begin End    Karlos Capone M.D. Anesth 2148 2217        Overtime Reason:  no overtime (within assigned shift)    Comments:

## 2022-06-14 NOTE — ASSESSMENT & PLAN NOTE
CT shows medullary nephrocalcinosis likely indicating hypercalcemia, renal tubular acidosis or medullary sponge kidney   The findings have been discussed with the patient  Ca WNL, will check PTH, & Vit D  Will likely need a modified diet, will need to follow up with urology and PCP    Alert-The patient is alert, awake and responds to voice. The patient is oriented to time, place, and person. The triage nurse is able to obtain subjective information.

## 2022-06-25 ENCOUNTER — HOSPITAL ENCOUNTER (OUTPATIENT)
Dept: LAB | Facility: MEDICAL CENTER | Age: 57
End: 2022-06-25
Attending: FAMILY MEDICINE
Payer: COMMERCIAL

## 2022-06-25 LAB
ALBUMIN SERPL BCP-MCNC: 4.5 G/DL (ref 3.2–4.9)
ALBUMIN/GLOB SERPL: 1.5 G/DL
ALP SERPL-CCNC: 92 U/L (ref 30–99)
ALT SERPL-CCNC: 18 U/L (ref 2–50)
ANION GAP SERPL CALC-SCNC: 13 MMOL/L (ref 7–16)
AST SERPL-CCNC: 15 U/L (ref 12–45)
BASOPHILS # BLD AUTO: 0.3 % (ref 0–1.8)
BASOPHILS # BLD: 0.02 K/UL (ref 0–0.12)
BILIRUB SERPL-MCNC: 0.2 MG/DL (ref 0.1–1.5)
BUN SERPL-MCNC: 12 MG/DL (ref 8–22)
CALCIUM SERPL-MCNC: 9.4 MG/DL (ref 8.5–10.5)
CHLORIDE SERPL-SCNC: 106 MMOL/L (ref 96–112)
CHOLEST SERPL-MCNC: 137 MG/DL (ref 100–199)
CO2 SERPL-SCNC: 22 MMOL/L (ref 20–33)
CREAT SERPL-MCNC: 0.62 MG/DL (ref 0.5–1.4)
CREAT UR-MCNC: 118.78 MG/DL
EOSINOPHIL # BLD AUTO: 0.15 K/UL (ref 0–0.51)
EOSINOPHIL NFR BLD: 2.1 % (ref 0–6.9)
ERYTHROCYTE [DISTWIDTH] IN BLOOD BY AUTOMATED COUNT: 45.3 FL (ref 35.9–50)
EST. AVERAGE GLUCOSE BLD GHB EST-MCNC: 131 MG/DL
FASTING STATUS PATIENT QL REPORTED: NORMAL
GFR SERPLBLD CREATININE-BSD FMLA CKD-EPI: 105 ML/MIN/1.73 M 2
GLOBULIN SER CALC-MCNC: 3 G/DL (ref 1.9–3.5)
GLUCOSE SERPL-MCNC: 125 MG/DL (ref 65–99)
HBA1C MFR BLD: 6.2 % (ref 4–5.6)
HCT VFR BLD AUTO: 38.5 % (ref 37–47)
HDLC SERPL-MCNC: 30 MG/DL
HGB BLD-MCNC: 12.3 G/DL (ref 12–16)
IMM GRANULOCYTES # BLD AUTO: 0.02 K/UL (ref 0–0.11)
IMM GRANULOCYTES NFR BLD AUTO: 0.3 % (ref 0–0.9)
LDLC SERPL CALC-MCNC: 88 MG/DL
LYMPHOCYTES # BLD AUTO: 1.82 K/UL (ref 1–4.8)
LYMPHOCYTES NFR BLD: 25.9 % (ref 22–41)
MCH RBC QN AUTO: 26.5 PG (ref 27–33)
MCHC RBC AUTO-ENTMCNC: 31.9 G/DL (ref 33.6–35)
MCV RBC AUTO: 82.8 FL (ref 81.4–97.8)
MICROALBUMIN UR-MCNC: 7.5 MG/DL
MICROALBUMIN/CREAT UR: 63 MG/G (ref 0–30)
MONOCYTES # BLD AUTO: 0.53 K/UL (ref 0–0.85)
MONOCYTES NFR BLD AUTO: 7.5 % (ref 0–13.4)
NEUTROPHILS # BLD AUTO: 4.5 K/UL (ref 2–7.15)
NEUTROPHILS NFR BLD: 63.9 % (ref 44–72)
NRBC # BLD AUTO: 0 K/UL
NRBC BLD-RTO: 0 /100 WBC
PLATELET # BLD AUTO: 507 K/UL (ref 164–446)
PMV BLD AUTO: 10.6 FL (ref 9–12.9)
POTASSIUM SERPL-SCNC: 4.2 MMOL/L (ref 3.6–5.5)
PROT SERPL-MCNC: 7.5 G/DL (ref 6–8.2)
RBC # BLD AUTO: 4.65 M/UL (ref 4.2–5.4)
SODIUM SERPL-SCNC: 141 MMOL/L (ref 135–145)
TRIGL SERPL-MCNC: 93 MG/DL (ref 0–149)
TSH SERPL DL<=0.005 MIU/L-ACNC: 0.59 UIU/ML (ref 0.38–5.33)
WBC # BLD AUTO: 7 K/UL (ref 4.8–10.8)

## 2022-06-25 PROCEDURE — 80053 COMPREHEN METABOLIC PANEL: CPT

## 2022-06-25 PROCEDURE — 36415 COLL VENOUS BLD VENIPUNCTURE: CPT

## 2022-06-25 PROCEDURE — 82043 UR ALBUMIN QUANTITATIVE: CPT

## 2022-06-25 PROCEDURE — 85025 COMPLETE CBC W/AUTO DIFF WBC: CPT

## 2022-06-25 PROCEDURE — 82570 ASSAY OF URINE CREATININE: CPT

## 2022-06-25 PROCEDURE — 80061 LIPID PANEL: CPT

## 2022-06-25 PROCEDURE — 84443 ASSAY THYROID STIM HORMONE: CPT

## 2022-06-25 PROCEDURE — 83036 HEMOGLOBIN GLYCOSYLATED A1C: CPT

## 2024-04-08 ENCOUNTER — APPOINTMENT (RX ONLY)
Dept: URBAN - METROPOLITAN AREA CLINIC 148 | Facility: CLINIC | Age: 59
Setting detail: DERMATOLOGY
End: 2024-04-08

## 2024-04-08 DIAGNOSIS — L21.8 OTHER SEBORRHEIC DERMATITIS: ICD-10-CM | Status: INADEQUATELY CONTROLLED

## 2024-04-08 DIAGNOSIS — L23.9 ALLERGIC CONTACT DERMATITIS, UNSPECIFIED CAUSE: ICD-10-CM | Status: INADEQUATELY CONTROLLED

## 2024-04-08 PROCEDURE — ? COUNSELING

## 2024-04-08 PROCEDURE — ? PRESCRIPTION MEDICATION MANAGEMENT

## 2024-04-08 PROCEDURE — ? PRESCRIPTION

## 2024-04-08 PROCEDURE — 99204 OFFICE O/P NEW MOD 45 MIN: CPT

## 2024-04-08 RX ORDER — KETOCONAZOLE 20 MG/ML
SHAMPOO, SUSPENSION TOPICAL BIW
Qty: 120 | Refills: 6 | Status: ERX | COMMUNITY
Start: 2024-04-08

## 2024-04-08 RX ORDER — MOMETASONE FUROATE 1 MG/G
CREAM TOPICAL
Qty: 45 | Refills: 1 | Status: ERX | COMMUNITY
Start: 2024-04-08

## 2024-04-08 RX ADMIN — KETOCONAZOLE: 20 SHAMPOO, SUSPENSION TOPICAL at 00:00

## 2024-04-08 RX ADMIN — MOMETASONE FUROATE: 1 CREAM TOPICAL at 00:00

## 2024-04-08 ASSESSMENT — LOCATION DETAILED DESCRIPTION DERM
LOCATION DETAILED: RIGHT CENTRAL FRONTAL SCALP
LOCATION DETAILED: LEFT SUPERIOR PARIETAL SCALP
LOCATION DETAILED: LEFT CENTRAL FRONTAL SCALP
LOCATION DETAILED: UPPER STERNUM

## 2024-04-08 ASSESSMENT — LOCATION SIMPLE DESCRIPTION DERM
LOCATION SIMPLE: CHEST
LOCATION SIMPLE: SCALP

## 2024-04-08 ASSESSMENT — LOCATION ZONE DERM
LOCATION ZONE: TRUNK
LOCATION ZONE: SCALP

## 2024-04-08 NOTE — PROCEDURE: PRESCRIPTION MEDICATION MANAGEMENT
Render In Strict Bullet Format?: No
Plan: Zoryve if no improvement
Detail Level: Detailed
Initiate Treatment: Ketoconazole shampoo 3 times / week \\nPatient to alternate between head and shoulders selsun blue and Ketoconazole shampoo
Initiate Treatment: Mometasone cream qd -bid x2weeks

## 2024-06-03 ENCOUNTER — APPOINTMENT (RX ONLY)
Dept: URBAN - METROPOLITAN AREA CLINIC 148 | Facility: CLINIC | Age: 59
Setting detail: DERMATOLOGY
End: 2024-06-03

## 2024-06-03 DIAGNOSIS — L21.8 OTHER SEBORRHEIC DERMATITIS: ICD-10-CM | Status: IMPROVED

## 2024-06-03 DIAGNOSIS — L23.9 ALLERGIC CONTACT DERMATITIS, UNSPECIFIED CAUSE: ICD-10-CM | Status: RESOLVED

## 2024-06-03 PROCEDURE — ? PRESCRIPTION MEDICATION MANAGEMENT

## 2024-06-03 PROCEDURE — ? COUNSELING

## 2024-06-03 PROCEDURE — 99214 OFFICE O/P EST MOD 30 MIN: CPT

## 2024-06-03 PROCEDURE — ? PRESCRIPTION

## 2024-06-03 RX ORDER — KETOCONAZOLE 20 MG/ML
SHAMPOO, SUSPENSION TOPICAL BIW
Qty: 120 | Refills: 6 | Status: ERX

## 2024-06-03 ASSESSMENT — LOCATION ZONE DERM
LOCATION ZONE: SCALP
LOCATION ZONE: TRUNK

## 2024-06-03 ASSESSMENT — LOCATION DETAILED DESCRIPTION DERM
LOCATION DETAILED: LEFT SUPERIOR PARIETAL SCALP
LOCATION DETAILED: RIGHT CENTRAL FRONTAL SCALP
LOCATION DETAILED: UPPER STERNUM
LOCATION DETAILED: LEFT CENTRAL FRONTAL SCALP

## 2024-06-03 ASSESSMENT — LOCATION SIMPLE DESCRIPTION DERM
LOCATION SIMPLE: CHEST
LOCATION SIMPLE: SCALP

## 2024-06-03 NOTE — PROCEDURE: PRESCRIPTION MEDICATION MANAGEMENT
Render In Strict Bullet Format?: No
Plan: Zoryve if no improvement
Continue Regimen: Ketoconazole shampoo 3 times / week \\nPatient to alternate between head and shoulders selsun blue and Ketoconazole shampoo
Detail Level: Detailed
Discontinue Regimen: Mometasone cream qd -bid x2weeks

## (undated) DEVICE — SCOPE DIGITAL URETEROSCOPE DISPOSABLE

## (undated) DEVICE — SUCTION INSTRUMENT YANKAUER BULBOUS TIP W/O VENT (50EA/CA)

## (undated) DEVICE — SODIUM CHL. IRRIGATION 0.9% 3000ML (4EA/CA 65CA/PF)

## (undated) DEVICE — SLEEVE VASO CALF MED - (10PR/CA)

## (undated) DEVICE — ELECTRODE 850 FOAM ADHESIVE - HYDROGEL RADIOTRNSPRNT (50/PK)

## (undated) DEVICE — JELLY SURGILUBE STERILE TUBE 4.25 OZ (1/EA)

## (undated) DEVICE — HUMID-VENT HEAT AND MOISTURE EXCHANGE- (50/BX)

## (undated) DEVICE — SENSOR OXIMETER ADULT SPO2 RD SET (20EA/BX)

## (undated) DEVICE — GOWN SURGEONS X-LARGE - DISP. (30/CA)

## (undated) DEVICE — JELLY SURGILUBE STERILE FOIL 5 GM (150EA/BX)

## (undated) DEVICE — GLOVE BIOGEL SZ 7.5 SURGICAL PF LTX - (50PR/BX 4BX/CA)

## (undated) DEVICE — SET IRRIGATION CYSTOSCOPY Y-TYPE L81 IN (20EA/CA)

## (undated) DEVICE — GOWN WARMING STANDARD FLEX - (30/CA)

## (undated) DEVICE — WATER IRRIGATION STERILE 1000ML (12EA/CA)

## (undated) DEVICE — DRAPE TABLE UROLOGY DRAINAGE (20EA/BX)

## (undated) DEVICE — SET LEADWIRE 5 LEAD BEDSIDE DISPOSABLE ECG (1SET OF 5/EA)

## (undated) DEVICE — CATHETER URET OPEN END 6FR (10EA/BX)

## (undated) DEVICE — MASK ANESTHESIA ADULT  - (100/CA)

## (undated) DEVICE — KIT ANESTHESIA W/CIRCUIT & 3/LT BAG W/FILTER (20EA/CA)

## (undated) DEVICE — WATER IRRIG. STER 3000 ML - (4/CA)

## (undated) DEVICE — LASER TRAC TIP 200 MIRCON FOR 100 WATT LASER

## (undated) DEVICE — CANISTER SUCTION RIGID RED 1500CC (40EA/CA)

## (undated) DEVICE — SET EXTENSION WITH 2 PORTS (48EA/CA) ***PART #2C8610 IS A SUBSTITUTE*****

## (undated) DEVICE — TUBE CONNECTING SUCTION - CLEAR PLASTIC STERILE 72 IN (50EA/CA)

## (undated) DEVICE — BAG SPONGE COUNT 10.25 X 32 - BLUE (250/CA)

## (undated) DEVICE — WIRE GUIDE SENSOR DUAL FLEX - 5/BX

## (undated) DEVICE — PACK CYSTOSCOPY III - (8/CA)

## (undated) DEVICE — SLEEVE, VASO, THIGH, MED

## (undated) DEVICE — SPONGE GAUZESTER 4 X 4 4PLY - (128PK/CA)

## (undated) DEVICE — CANISTER SUCTION 3000ML MECHANICAL FILTER AUTO SHUTOFF MEDI-VAC NONSTERILE LF DISP  (40EA/CA)

## (undated) DEVICE — NEPTUNE 4 PORT MANIFOLD - (20/PK)

## (undated) DEVICE — BAG URODRAIN WITH TUBING - (20/CA)

## (undated) DEVICE — TUBING CLEARLINK DUO-VENT - C-FLO (48EA/CA)

## (undated) DEVICE — LACTATED RINGERS INJ 1000 ML - (14EA/CA 60CA/PF)

## (undated) DEVICE — HEAD HOLDER JUNIOR/ADULT

## (undated) DEVICE — TOWEL STOP TIMEOUT SAFETY FLAG (40EA/CA)

## (undated) DEVICE — ELECTRODE DUAL RETURN W/ CORD - (50/PK)

## (undated) DEVICE — PROTECTOR ULNA NERVE - (36PR/CA)

## (undated) DEVICE — SODIUM CHL IRRIGATION 0.9% 1000ML (12EA/CA)

## (undated) DEVICE — GOWN SURGICAL X-LARGE ULTRA - FILM-REINFORCED (20/CA)